# Patient Record
Sex: FEMALE | Race: WHITE | NOT HISPANIC OR LATINO | Employment: PART TIME | URBAN - METROPOLITAN AREA
[De-identification: names, ages, dates, MRNs, and addresses within clinical notes are randomized per-mention and may not be internally consistent; named-entity substitution may affect disease eponyms.]

---

## 2022-04-06 ENCOUNTER — HISTORICAL (OUTPATIENT)
Dept: ADMINISTRATIVE | Facility: HOSPITAL | Age: 47
End: 2022-04-06

## 2022-04-06 LAB
ABS NEUT (OLG): 7.46 (ref 2.1–9.2)
ALBUMIN SERPL-MCNC: 3.7 G/DL (ref 3.5–5)
ALBUMIN/GLOB SERPL: 1.1 {RATIO} (ref 1.1–2)
ALP SERPL-CCNC: 87 U/L (ref 40–150)
ALT SERPL-CCNC: 92 U/L (ref 0–55)
AST SERPL-CCNC: 64 U/L (ref 5–34)
BASOPHILS # BLD AUTO: 0 10*3/UL (ref 0–0.2)
BASOPHILS NFR BLD AUTO: 0 %
BILIRUB SERPL-MCNC: 0.4 MG/DL
BILIRUBIN DIRECT+TOT PNL SERPL-MCNC: 0.2 (ref 0–0.5)
BILIRUBIN DIRECT+TOT PNL SERPL-MCNC: 0.2 (ref 0–0.8)
BUN SERPL-MCNC: 4.8 MG/DL (ref 7–18.7)
CALCIUM SERPL-MCNC: 9.1 MG/DL (ref 8.7–10.5)
CHLORIDE SERPL-SCNC: 99 MMOL/L (ref 98–107)
CO2 SERPL-SCNC: 27 MMOL/L (ref 22–29)
CREAT SERPL-MCNC: 0.69 MG/DL (ref 0.55–1.02)
CRP SERPL HS-MCNC: 1.82 MG/L
DEPRECATED CALCIDIOL+CALCIFEROL SERPL-MC: 17.5 NG/ML (ref 30–80)
EOSINOPHIL # BLD AUTO: 0.2 10*3/UL (ref 0–0.9)
EOSINOPHIL NFR BLD AUTO: 2 %
ERYTHROCYTE [DISTWIDTH] IN BLOOD BY AUTOMATED COUNT: 13.1 % (ref 11.5–14.5)
FLAG2 (OHS): 70
FLAG3 (OHS): 80
FLAGS (OHS): 80
GLOBULIN SER-MCNC: 3.4 G/DL (ref 2.4–3.5)
GLUCOSE SERPL-MCNC: 159 MG/DL (ref 74–100)
HCT VFR BLD AUTO: 41.2 % (ref 35–46)
HEMOLYSIS INTERF INDEX SERPL-ACNC: 4
HGB BLD-MCNC: 13.5 G/DL (ref 12–16)
ICTERIC INTERF INDEX SERPL-ACNC: 1
IMM GRANULOCYTES # BLD AUTO: 0.04 10*3/UL
IMM GRANULOCYTES NFR BLD AUTO: 0 %
LIPEMIC INTERF INDEX SERPL-ACNC: 3
LOW EVENT # SUSPECT FLAG (OHS): 80
LYMPHOCYTES # BLD AUTO: 1.7 10*3/UL (ref 0.6–4.6)
LYMPHOCYTES NFR BLD AUTO: 16 %
MANUAL DIFF? (OHS): NO
MCH RBC QN AUTO: 29.7 PG (ref 26–34)
MCHC RBC AUTO-ENTMCNC: 32.8 G/DL (ref 31–37)
MCV RBC AUTO: 90.7 FL (ref 80–100)
MO BLASTS SUSPECT FLAG (OHS): 40
MONOCYTES # BLD AUTO: 0.8 10*3/UL (ref 0.1–1.3)
MONOCYTES NFR BLD AUTO: 8 %
NEUTROPHILS # BLD AUTO: 7.46 10*3/UL (ref 2.1–9.2)
NEUTROPHILS NFR BLD AUTO: 73 %
NRBC BLD AUTO-RTO: 0 % (ref 0–0.2)
PLATELET # BLD AUTO: 264 10*3/UL (ref 130–400)
PLATELET CLUMPS SUSPECT FLAG (OHS): 70
PMV BLD AUTO: 12.9 FL (ref 7.4–10.4)
POTASSIUM SERPL-SCNC: 4.6 MMOL/L (ref 3.5–5.1)
PROT SERPL-MCNC: 7.1 G/DL (ref 6.4–8.3)
RBC # BLD AUTO: 4.54 10*6/UL (ref 4–5.2)
SODIUM SERPL-SCNC: 137 MMOL/L (ref 136–145)
URATE SERPL-MCNC: 6.3 MG/DL (ref 2.6–6)
WBC # SPEC AUTO: 10.2 10*3/UL (ref 4.5–11)

## 2022-07-05 ENCOUNTER — OFFICE VISIT (OUTPATIENT)
Dept: RHEUMATOLOGY | Facility: CLINIC | Age: 47
End: 2022-07-05
Payer: MEDICAID

## 2022-07-05 VITALS
TEMPERATURE: 99 F | DIASTOLIC BLOOD PRESSURE: 88 MMHG | SYSTOLIC BLOOD PRESSURE: 138 MMHG | HEART RATE: 94 BPM | BODY MASS INDEX: 41.78 KG/M2 | HEIGHT: 66 IN | RESPIRATION RATE: 18 BRPM | WEIGHT: 260 LBS | OXYGEN SATURATION: 99 %

## 2022-07-05 DIAGNOSIS — L40.50 PSORIATIC ARTHRITIS: Primary | ICD-10-CM

## 2022-07-05 DIAGNOSIS — E79.0 HYPERURICEMIA: ICD-10-CM

## 2022-07-05 DIAGNOSIS — E55.9 VITAMIN D DEFICIENCY: ICD-10-CM

## 2022-07-05 DIAGNOSIS — E03.8 CENTRAL HYPOTHYROIDISM: ICD-10-CM

## 2022-07-05 DIAGNOSIS — L40.9 PSORIASIS: ICD-10-CM

## 2022-07-05 DIAGNOSIS — M79.7 FIBROMYALGIA SYNDROME: ICD-10-CM

## 2022-07-05 PROCEDURE — 4010F ACE/ARB THERAPY RXD/TAKEN: CPT | Mod: CPTII,,, | Performed by: INTERNAL MEDICINE

## 2022-07-05 PROCEDURE — 3008F PR BODY MASS INDEX (BMI) DOCUMENTED: ICD-10-PCS | Mod: CPTII,,, | Performed by: INTERNAL MEDICINE

## 2022-07-05 PROCEDURE — 3008F BODY MASS INDEX DOCD: CPT | Mod: CPTII,,, | Performed by: INTERNAL MEDICINE

## 2022-07-05 PROCEDURE — 99999 PR PBB SHADOW E&M-EST. PATIENT-LVL IV: ICD-10-PCS | Mod: PBBFAC,,, | Performed by: INTERNAL MEDICINE

## 2022-07-05 PROCEDURE — 99214 OFFICE O/P EST MOD 30 MIN: CPT | Mod: S$PBB,,, | Performed by: INTERNAL MEDICINE

## 2022-07-05 PROCEDURE — 1160F RVW MEDS BY RX/DR IN RCRD: CPT | Mod: CPTII,,, | Performed by: INTERNAL MEDICINE

## 2022-07-05 PROCEDURE — 1159F PR MEDICATION LIST DOCUMENTED IN MEDICAL RECORD: ICD-10-PCS | Mod: CPTII,,, | Performed by: INTERNAL MEDICINE

## 2022-07-05 PROCEDURE — 1160F PR REVIEW ALL MEDS BY PRESCRIBER/CLIN PHARMACIST DOCUMENTED: ICD-10-PCS | Mod: CPTII,,, | Performed by: INTERNAL MEDICINE

## 2022-07-05 PROCEDURE — 99214 PR OFFICE/OUTPT VISIT, EST, LEVL IV, 30-39 MIN: ICD-10-PCS | Mod: S$PBB,,, | Performed by: INTERNAL MEDICINE

## 2022-07-05 PROCEDURE — 99214 OFFICE O/P EST MOD 30 MIN: CPT | Mod: PBBFAC | Performed by: INTERNAL MEDICINE

## 2022-07-05 PROCEDURE — 4010F PR ACE/ARB THEARPY RXD/TAKEN: ICD-10-PCS | Mod: CPTII,,, | Performed by: INTERNAL MEDICINE

## 2022-07-05 PROCEDURE — 99999 PR PBB SHADOW E&M-EST. PATIENT-LVL IV: CPT | Mod: PBBFAC,,, | Performed by: INTERNAL MEDICINE

## 2022-07-05 PROCEDURE — 1159F MED LIST DOCD IN RCRD: CPT | Mod: CPTII,,, | Performed by: INTERNAL MEDICINE

## 2022-07-05 RX ORDER — CLOBETASOL PROPIONATE 0.5 MG/G
1 CREAM TOPICAL 2 TIMES DAILY
COMMUNITY
Start: 2022-05-05 | End: 2023-03-03 | Stop reason: SDUPTHER

## 2022-07-05 RX ORDER — PRAVASTATIN SODIUM 10 MG/1
10 TABLET ORAL NIGHTLY
COMMUNITY
Start: 2022-05-08

## 2022-07-05 RX ORDER — CALCIPOTRIENE AND BETAMETHASONE DIPROPIONATE 50; .5 UG/G; MG/G
SUSPENSION TOPICAL
COMMUNITY
Start: 2022-06-04 | End: 2022-09-09 | Stop reason: SDUPTHER

## 2022-07-05 RX ORDER — FOLIC ACID 1 MG/1
1000 TABLET ORAL DAILY
COMMUNITY
Start: 2022-06-28 | End: 2022-07-05

## 2022-07-05 RX ORDER — CHOLECALCIFEROL (VITAMIN D3) 1250 MCG
50000 CAPSULE ORAL
COMMUNITY
Start: 2022-06-28 | End: 2023-03-03

## 2022-07-05 RX ORDER — TRIAMCINOLONE ACETONIDE 1 MG/G
CREAM TOPICAL 2 TIMES DAILY
COMMUNITY
Start: 2022-06-04 | End: 2023-03-03 | Stop reason: SDUPTHER

## 2022-07-05 RX ORDER — INSULIN GLARGINE 100 [IU]/ML
20 INJECTION, SOLUTION SUBCUTANEOUS
COMMUNITY

## 2022-07-05 RX ORDER — LIRAGLUTIDE 6 MG/ML
1.8 INJECTION SUBCUTANEOUS
COMMUNITY

## 2022-07-05 RX ORDER — LISINOPRIL AND HYDROCHLOROTHIAZIDE 12.5; 2 MG/1; MG/1
1 TABLET ORAL DAILY
COMMUNITY
Start: 2022-06-07 | End: 2022-09-09 | Stop reason: SDUPTHER

## 2022-07-05 RX ORDER — CYCLOBENZAPRINE HCL 10 MG
10 TABLET ORAL NIGHTLY
COMMUNITY
Start: 2022-05-23 | End: 2022-07-05

## 2022-07-05 RX ORDER — TIZANIDINE 4 MG/1
4 TABLET ORAL NIGHTLY
Qty: 30 TABLET | Refills: 5 | Status: SHIPPED | OUTPATIENT
Start: 2022-07-05 | End: 2022-10-18

## 2022-07-05 RX ORDER — APREMILAST 30 MG/1
1 TABLET, FILM COATED ORAL 2 TIMES DAILY
COMMUNITY
Start: 2022-07-02 | End: 2022-07-05 | Stop reason: SDUPTHER

## 2022-07-05 RX ORDER — APREMILAST 30 MG/1
1 TABLET, FILM COATED ORAL 2 TIMES DAILY
Qty: 60 TABLET | Refills: 11 | Status: SHIPPED | OUTPATIENT
Start: 2022-07-05 | End: 2023-03-03 | Stop reason: SINTOL

## 2022-07-05 RX ORDER — DESVENLAFAXINE 100 MG/1
100 TABLET, EXTENDED RELEASE ORAL DAILY
COMMUNITY
Start: 2022-07-03 | End: 2023-10-10

## 2022-07-05 RX ORDER — LEFLUNOMIDE 20 MG/1
20 TABLET ORAL DAILY
Qty: 30 TABLET | Refills: 11 | Status: SHIPPED | OUTPATIENT
Start: 2022-07-05 | End: 2023-03-03

## 2022-07-05 RX ORDER — AMLODIPINE BESYLATE 5 MG/1
5 TABLET ORAL DAILY
COMMUNITY
Start: 2022-06-04

## 2022-07-05 RX ORDER — METHOTREXATE 2.5 MG/1
10 TABLET ORAL
COMMUNITY
Start: 2022-06-20 | End: 2022-07-05

## 2022-07-05 NOTE — PROGRESS NOTES
"Subjective:       Patient ID: Bing Pedraza is a 46 y.o. female.    Chief Complaint: 3 month follow up (Rash on face & medication questions)    The patient is complaining of joint pain involving the MCP PIP wrist elbow shoulders hips knees and ankles bilaterally.  The pain is 5/10 in intensity dull in quality and continuous.  That is associated with a morning stiffness lasting for more than 60 minutes.  Is also having difficulty maintaining a good night of sleep.  This has been associated with myalgias.  Muscle aches are 6/10 in intensity dull in quality and continuous.  They are associated with fatigue.  Psoriasis      Review of Systems   Constitutional: Negative for appetite change, chills and fever.   HENT: Negative for congestion, ear pain, mouth sores, nosebleeds and trouble swallowing.    Eyes: Negative for photophobia and discharge.   Respiratory: Negative for chest tightness and shortness of breath.    Cardiovascular: Negative for chest pain.   Gastrointestinal: Negative for abdominal pain and vomiting.   Endocrine: Negative.    Genitourinary: Negative for hematuria.   Musculoskeletal:        As per HPI   Skin: Positive for rash.        Psoriasis   Neurological: Negative for weakness.         Objective:   /88 (BP Location: Right arm, Patient Position: Sitting, BP Method: Large (Automatic))   Pulse 94   Temp 98.8 °F (37.1 °C)   Resp 18   Ht 5' 6" (1.676 m)   Wt 117.9 kg (260 lb)   SpO2 99%   BMI 41.97 kg/m²      Physical Exam   Constitutional: She is oriented to person, place, and time. She appears well-developed and well-nourished. No distress.   HENT:   Head: Normocephalic and atraumatic.   Right Ear: External ear normal.   Left Ear: External ear normal.   Eyes: Pupils are equal, round, and reactive to light.   Cardiovascular: Normal rate, regular rhythm and normal heart sounds.   Pulmonary/Chest: Breath sounds normal.   Abdominal: Soft. There is no abdominal tenderness. "   Musculoskeletal:      Right shoulder: Tenderness present.      Left shoulder: Tenderness present.      Right elbow: Tenderness present.      Left elbow: Tenderness present.      Right wrist: Tenderness present.      Left wrist: Tenderness present.      Cervical back: Neck supple.      Right hip: Tenderness present.      Left hip: Tenderness present.      Right knee: Tenderness present.      Left knee: Tenderness present.      Right ankle: Tenderness present.      Left ankle: Tenderness present.   Lymphadenopathy:     She has no cervical adenopathy.   Neurological: She is alert and oriented to person, place, and time. She displays normal reflexes. No cranial nerve deficit or sensory deficit. She exhibits normal muscle tone. Coordination normal.   Skin: There is erythema.   Psoriasis   Vitals reviewed.      Right Side Rheumatological Exam     The patient is tender to palpation of the shoulder, elbow, wrist, knee, 1st PIP, 1st MCP, 2nd PIP, 2nd MCP, 3rd PIP, 3rd MCP, 4th PIP, 4th MCP, 5th PIP, hip, ankle, 1st MTP, 2nd MTP, 3rd MTP, 4th MTP, 5th MTP, 1st toe IP, 2nd toe IP, 3rd toe IP, 4th toe IP and 5th toe IP    Left Side Rheumatological Exam     The patient is tender to palpation of the shoulder, elbow, wrist, knee, 1st PIP, 1st MCP, 2nd PIP, 2nd MCP, 3rd PIP, 3rd MCP, 4th PIP, 4th MCP, 5th PIP, 5th MCP, hip, ankle, 1st MTP, 2nd MTP, 3rd MTP, 4th MTP, 5th MTP, 1st toe IP, 2nd toe IP, 3rd toe IP, 4th toe IP and 5th toe IP.         Completed Fibromyalgia exam 18/18 tender points.  No data to display     Assessment:       1. Psoriatic arthritis    2. Psoriasis    3. Fibromyalgia syndrome    4. Central hypothyroidism    5. Hyperuricemia    6. Vitamin D deficiency            Plan:       Problem List Items Addressed This Visit    None     Visit Diagnoses     Psoriatic arthritis    -  Primary    Relevant Medications    leflunomide (ARAVA) 20 MG Tab    OTEZLA 30 mg Tab    tiZANidine (ZANAFLEX) 4 MG tablet    Other Relevant  Orders    CBC Auto Differential    Comprehensive Metabolic Panel    CRP, High Sensitivity    Urinalysis    Vitamin D    Uric Acid    Urinalysis    Psoriasis        Relevant Medications    leflunomide (ARAVA) 20 MG Tab    OTEZLA 30 mg Tab    tiZANidine (ZANAFLEX) 4 MG tablet    Other Relevant Orders    CBC Auto Differential    Comprehensive Metabolic Panel    CRP, High Sensitivity    Urinalysis    Vitamin D    Uric Acid    Urinalysis    Fibromyalgia syndrome        Relevant Medications    leflunomide (ARAVA) 20 MG Tab    OTEZLA 30 mg Tab    tiZANidine (ZANAFLEX) 4 MG tablet    Other Relevant Orders    CBC Auto Differential    Comprehensive Metabolic Panel    CRP, High Sensitivity    Urinalysis    Vitamin D    Uric Acid    Urinalysis    Central hypothyroidism        Relevant Medications    leflunomide (ARAVA) 20 MG Tab    OTEZLA 30 mg Tab    tiZANidine (ZANAFLEX) 4 MG tablet    Other Relevant Orders    CBC Auto Differential    Comprehensive Metabolic Panel    CRP, High Sensitivity    Urinalysis    Vitamin D    Uric Acid    Urinalysis    Hyperuricemia        Relevant Medications    leflunomide (ARAVA) 20 MG Tab    OTEZLA 30 mg Tab    tiZANidine (ZANAFLEX) 4 MG tablet    Other Relevant Orders    CBC Auto Differential    Comprehensive Metabolic Panel    CRP, High Sensitivity    Urinalysis    Vitamin D    Uric Acid    Urinalysis    Vitamin D deficiency        Relevant Medications    leflunomide (ARAVA) 20 MG Tab    OTEZLA 30 mg Tab    tiZANidine (ZANAFLEX) 4 MG tablet    Other Relevant Orders    CBC Auto Differential    Comprehensive Metabolic Panel    CRP, High Sensitivity    Urinalysis    Vitamin D    Uric Acid    Urinalysis

## 2022-09-09 ENCOUNTER — TELEPHONE (OUTPATIENT)
Dept: RHEUMATOLOGY | Facility: CLINIC | Age: 47
End: 2022-09-09
Payer: MEDICAID

## 2022-09-09 DIAGNOSIS — I10 HYPERTENSION, UNSPECIFIED TYPE: ICD-10-CM

## 2022-09-09 DIAGNOSIS — L40.50 PSORIATIC ARTHRITIS: Primary | ICD-10-CM

## 2022-09-09 RX ORDER — CALCIPOTRIENE AND BETAMETHASONE DIPROPIONATE 50; .5 UG/G; MG/G
SUSPENSION TOPICAL
Qty: 60 G | Refills: 5 | Status: SHIPPED | OUTPATIENT
Start: 2022-09-09 | End: 2023-02-14 | Stop reason: SDUPTHER

## 2022-09-09 RX ORDER — LISINOPRIL AND HYDROCHLOROTHIAZIDE 12.5; 2 MG/1; MG/1
1 TABLET ORAL DAILY
Qty: 30 TABLET | Refills: 5 | Status: SHIPPED | OUTPATIENT
Start: 2022-09-09 | End: 2023-06-12

## 2022-09-09 NOTE — TELEPHONE ENCOUNTER
----- Message from Radha Lemus sent at 9/9/2022 10:14 AM CDT -----  Regarding: medication refill  Patient called requesting refill for Lisinopril and calcipotriene-betamethasone. [facility]

## 2022-09-14 DIAGNOSIS — Z86.711 HX OF PULMONARY EMBOLUS: Primary | ICD-10-CM

## 2022-10-11 ENCOUNTER — LAB VISIT (OUTPATIENT)
Dept: LAB | Facility: HOSPITAL | Age: 47
End: 2022-10-11
Attending: INTERNAL MEDICINE
Payer: MEDICAID

## 2022-10-11 DIAGNOSIS — E79.0 HYPERURICEMIA: ICD-10-CM

## 2022-10-11 DIAGNOSIS — E55.9 VITAMIN D DEFICIENCY: ICD-10-CM

## 2022-10-11 DIAGNOSIS — L40.50 PSORIATIC ARTHRITIS: ICD-10-CM

## 2022-10-11 DIAGNOSIS — M79.7 FIBROMYALGIA SYNDROME: ICD-10-CM

## 2022-10-11 DIAGNOSIS — E03.8 CENTRAL HYPOTHYROIDISM: ICD-10-CM

## 2022-10-11 DIAGNOSIS — L40.9 PSORIASIS: ICD-10-CM

## 2022-10-11 DIAGNOSIS — E11.9 DIABETES MELLITUS WITHOUT COMPLICATION: Primary | ICD-10-CM

## 2022-10-11 DIAGNOSIS — Z86.711 HX OF PULMONARY EMBOLUS: ICD-10-CM

## 2022-10-11 LAB
ALBUMIN SERPL-MCNC: 4.1 GM/DL (ref 3.5–5)
ALBUMIN/GLOB SERPL: 1 RATIO (ref 1.1–2)
ALP SERPL-CCNC: 94 UNIT/L (ref 40–150)
ALT SERPL-CCNC: 143 UNIT/L (ref 0–55)
APPEARANCE UR: CLEAR
AST SERPL-CCNC: 107 UNIT/L (ref 5–34)
BACTERIA #/AREA URNS AUTO: NORMAL /HPF
BASOPHILS # BLD AUTO: 0.08 X10(3)/MCL (ref 0–0.2)
BASOPHILS NFR BLD AUTO: 0.7 %
BILIRUB UR QL STRIP.AUTO: NEGATIVE MG/DL
BILIRUBIN DIRECT+TOT PNL SERPL-MCNC: 0.5 MG/DL
BUN SERPL-MCNC: 11.6 MG/DL (ref 7–18.7)
CALCIUM SERPL-MCNC: 10.5 MG/DL (ref 8.4–10.2)
CHLORIDE SERPL-SCNC: 96 MMOL/L (ref 98–107)
CO2 SERPL-SCNC: 28 MMOL/L (ref 22–29)
COLOR UR AUTO: YELLOW
CREAT SERPL-MCNC: 1.02 MG/DL (ref 0.55–1.02)
CRP SERPL HS-MCNC: 23.57 MG/L
D DIMER PPP IA.FEU-MCNC: 2.22 UG/ML FEU (ref 0–0.5)
DEPRECATED CALCIDIOL+CALCIFEROL SERPL-MC: 145.5 NG/ML (ref 30–80)
EOSINOPHIL # BLD AUTO: 0.18 X10(3)/MCL (ref 0–0.9)
EOSINOPHIL NFR BLD AUTO: 1.6 %
ERYTHROCYTE [DISTWIDTH] IN BLOOD BY AUTOMATED COUNT: 13.2 % (ref 11.5–17)
EST. AVERAGE GLUCOSE BLD GHB EST-MCNC: 154.2 MG/DL
GFR SERPLBLD CREATININE-BSD FMLA CKD-EPI: >60 MLS/MIN/1.73/M2
GLOBULIN SER-MCNC: 4 GM/DL (ref 2.4–3.5)
GLUCOSE SERPL-MCNC: 205 MG/DL (ref 74–100)
GLUCOSE UR QL STRIP.AUTO: NEGATIVE MG/DL
HBA1C MFR BLD: 7 %
HCT VFR BLD AUTO: 46.2 % (ref 37–47)
HGB BLD-MCNC: 15.2 GM/DL (ref 12–16)
IMM GRANULOCYTES # BLD AUTO: 0.09 X10(3)/MCL (ref 0–0.04)
IMM GRANULOCYTES NFR BLD AUTO: 0.8 %
KETONES UR QL STRIP.AUTO: NEGATIVE MG/DL
LEUKOCYTE ESTERASE UR QL STRIP.AUTO: NEGATIVE UNIT/L
LYMPHOCYTES # BLD AUTO: 2.25 X10(3)/MCL (ref 0.6–4.6)
LYMPHOCYTES NFR BLD AUTO: 20.3 %
MCH RBC QN AUTO: 29.9 PG (ref 27–31)
MCHC RBC AUTO-ENTMCNC: 32.9 MG/DL (ref 33–36)
MCV RBC AUTO: 90.9 FL (ref 80–94)
MONOCYTES # BLD AUTO: 0.79 X10(3)/MCL (ref 0.1–1.3)
MONOCYTES NFR BLD AUTO: 7.1 %
NEUTROPHILS # BLD AUTO: 7.7 X10(3)/MCL (ref 2.1–9.2)
NEUTROPHILS NFR BLD AUTO: 69.5 %
NITRITE UR QL STRIP.AUTO: NEGATIVE
NRBC BLD AUTO-RTO: 0 %
PH UR STRIP.AUTO: 6 [PH]
PLATELET # BLD AUTO: 346 X10(3)/MCL (ref 130–400)
PMV BLD AUTO: 12.2 FL (ref 7.4–10.4)
POTASSIUM SERPL-SCNC: 5.1 MMOL/L (ref 3.5–5.1)
PROT SERPL-MCNC: 8.1 GM/DL (ref 6.4–8.3)
PROT UR QL STRIP.AUTO: NEGATIVE MG/DL
RBC # BLD AUTO: 5.08 X10(6)/MCL (ref 4.2–5.4)
RBC #/AREA URNS AUTO: <5 /HPF
RBC UR QL AUTO: NEGATIVE UNIT/L
SODIUM SERPL-SCNC: 135 MMOL/L (ref 136–145)
SP GR UR STRIP.AUTO: 1.01 (ref 1–1.03)
SQUAMOUS #/AREA URNS AUTO: <5 /HPF
URATE SERPL-MCNC: 6.7 MG/DL (ref 2.6–6)
UROBILINOGEN UR STRIP-ACNC: 0.2 MG/DL
WBC # SPEC AUTO: 11.1 X10(3)/MCL (ref 4.5–11.5)
WBC #/AREA URNS AUTO: <5 /HPF

## 2022-10-11 PROCEDURE — 82306 VITAMIN D 25 HYDROXY: CPT

## 2022-10-11 PROCEDURE — 36415 COLL VENOUS BLD VENIPUNCTURE: CPT

## 2022-10-11 PROCEDURE — 80053 COMPREHEN METABOLIC PANEL: CPT

## 2022-10-11 PROCEDURE — 86141 C-REACTIVE PROTEIN HS: CPT

## 2022-10-11 PROCEDURE — 85025 COMPLETE CBC W/AUTO DIFF WBC: CPT

## 2022-10-11 PROCEDURE — 83036 HEMOGLOBIN GLYCOSYLATED A1C: CPT

## 2022-10-11 PROCEDURE — 84550 ASSAY OF BLOOD/URIC ACID: CPT

## 2022-10-11 PROCEDURE — 85379 FIBRIN DEGRADATION QUANT: CPT

## 2022-10-11 PROCEDURE — 81001 URINALYSIS AUTO W/SCOPE: CPT

## 2022-10-17 ENCOUNTER — OFFICE VISIT (OUTPATIENT)
Dept: RHEUMATOLOGY | Facility: CLINIC | Age: 47
End: 2022-10-17
Payer: MEDICAID

## 2022-10-17 VITALS
HEART RATE: 100 BPM | TEMPERATURE: 98 F | DIASTOLIC BLOOD PRESSURE: 86 MMHG | OXYGEN SATURATION: 98 % | SYSTOLIC BLOOD PRESSURE: 124 MMHG | HEIGHT: 66 IN | BODY MASS INDEX: 38.38 KG/M2 | WEIGHT: 238.81 LBS | RESPIRATION RATE: 18 BRPM

## 2022-10-17 DIAGNOSIS — L40.50 PSORIATIC ARTHRITIS: Primary | ICD-10-CM

## 2022-10-17 DIAGNOSIS — E03.8 CENTRAL HYPOTHYROIDISM: ICD-10-CM

## 2022-10-17 DIAGNOSIS — E55.9 VITAMIN D DEFICIENCY: ICD-10-CM

## 2022-10-17 DIAGNOSIS — M79.7 FIBROMYALGIA SYNDROME: ICD-10-CM

## 2022-10-17 DIAGNOSIS — I10 HYPERTENSION, UNSPECIFIED TYPE: ICD-10-CM

## 2022-10-17 DIAGNOSIS — L40.9 PSORIASIS: ICD-10-CM

## 2022-10-17 DIAGNOSIS — E79.0 HYPERURICEMIA: ICD-10-CM

## 2022-10-17 DIAGNOSIS — E78.5 DYSLIPIDEMIA: ICD-10-CM

## 2022-10-17 PROCEDURE — 1159F PR MEDICATION LIST DOCUMENTED IN MEDICAL RECORD: ICD-10-PCS | Mod: CPTII,,, | Performed by: INTERNAL MEDICINE

## 2022-10-17 PROCEDURE — 99214 OFFICE O/P EST MOD 30 MIN: CPT | Mod: S$PBB,,, | Performed by: INTERNAL MEDICINE

## 2022-10-17 PROCEDURE — 4010F PR ACE/ARB THEARPY RXD/TAKEN: ICD-10-PCS | Mod: CPTII,,, | Performed by: INTERNAL MEDICINE

## 2022-10-17 PROCEDURE — 3074F SYST BP LT 130 MM HG: CPT | Mod: CPTII,,, | Performed by: INTERNAL MEDICINE

## 2022-10-17 PROCEDURE — 1159F MED LIST DOCD IN RCRD: CPT | Mod: CPTII,,, | Performed by: INTERNAL MEDICINE

## 2022-10-17 PROCEDURE — 3079F PR MOST RECENT DIASTOLIC BLOOD PRESSURE 80-89 MM HG: ICD-10-PCS | Mod: CPTII,,, | Performed by: INTERNAL MEDICINE

## 2022-10-17 PROCEDURE — 3079F DIAST BP 80-89 MM HG: CPT | Mod: CPTII,,, | Performed by: INTERNAL MEDICINE

## 2022-10-17 PROCEDURE — 99214 OFFICE O/P EST MOD 30 MIN: CPT | Mod: PBBFAC | Performed by: INTERNAL MEDICINE

## 2022-10-17 PROCEDURE — 99999 PR PBB SHADOW E&M-EST. PATIENT-LVL IV: CPT | Mod: PBBFAC,,, | Performed by: INTERNAL MEDICINE

## 2022-10-17 PROCEDURE — 3074F PR MOST RECENT SYSTOLIC BLOOD PRESSURE < 130 MM HG: ICD-10-PCS | Mod: CPTII,,, | Performed by: INTERNAL MEDICINE

## 2022-10-17 PROCEDURE — 4010F ACE/ARB THERAPY RXD/TAKEN: CPT | Mod: CPTII,,, | Performed by: INTERNAL MEDICINE

## 2022-10-17 PROCEDURE — 99214 PR OFFICE/OUTPT VISIT, EST, LEVL IV, 30-39 MIN: ICD-10-PCS | Mod: S$PBB,,, | Performed by: INTERNAL MEDICINE

## 2022-10-17 PROCEDURE — 99999 PR PBB SHADOW E&M-EST. PATIENT-LVL IV: ICD-10-PCS | Mod: PBBFAC,,, | Performed by: INTERNAL MEDICINE

## 2022-10-17 NOTE — PROGRESS NOTES
"Subjective:       Patient ID: Bing Pedraza is a 47 y.o. female.    Chief Complaint: 3 Month follow up (^ R leg pain radiating down R foot)    The patient is complaining of joint pain involving the MCP PIP wrist elbow shoulders hips knees and ankles bilaterally.  The pain is 10/10 in intensity dull in quality and continuous.  That is associated with a morning stiffness lasting for more than 60 minutes.  Is also having difficulty maintaining a good night of sleep.  This has been associated with myalgias.  Muscle aches are 10/10 in intensity dull in quality and continuous.  They are associated with fatigue.  No fever no chills no others.  PSORIASIS FLARE UP. 90 % BSA    Review of Systems   Constitutional:  Negative for appetite change, chills and fever.   HENT:  Negative for congestion, ear pain, mouth sores, nosebleeds and trouble swallowing.    Eyes:  Negative for photophobia and discharge.   Respiratory:  Negative for chest tightness and shortness of breath.    Cardiovascular:  Negative for chest pain.   Gastrointestinal:  Negative for abdominal pain and vomiting.   Endocrine: Negative.    Genitourinary:  Negative for hematuria.   Musculoskeletal:         As per HPI   Skin:  Positive for rash.        PSORIASIS FLARE UP. 90 % BSA   Neurological:  Negative for weakness.       Objective:   /86 (BP Location: Left arm, Patient Position: Sitting, BP Method: Large (Automatic))   Pulse 100   Temp 98.3 °F (36.8 °C) (Temporal)   Resp 18   Ht 5' 6" (1.676 m)   Wt 108.3 kg (238 lb 12.8 oz)   SpO2 98%   BMI 38.54 kg/m²      Physical Exam   Musculoskeletal:      Right shoulder: Tenderness present.      Left shoulder: Tenderness present.      Right elbow: Tenderness present.      Left elbow: Tenderness present.      Right wrist: Tenderness present.      Left wrist: Tenderness present.      Right hip: Tenderness present.      Left hip: Tenderness present.      Right knee: Tenderness present.      Left knee: " Tenderness present.      Right ankle: Tenderness present.      Left ankle: Tenderness present.   Skin:   PSORIASIS FLARE UP. 90 % BSA       Right Side Rheumatological Exam     The patient is tender to palpation of the shoulder, elbow, wrist, knee, 1st PIP, 1st MCP, 2nd PIP, 2nd MCP, 3rd PIP, 3rd MCP, 4th PIP, 4th MCP, 5th PIP, hip, ankle, 1st MTP, 2nd MTP, 3rd MTP, 4th MTP, 5th MTP, 1st toe IP, 2nd toe IP, 3rd toe IP, 4th toe IP and 5th toe IP    Left Side Rheumatological Exam     The patient is tender to palpation of the shoulder, elbow, wrist, knee, 1st PIP, 1st MCP, 2nd PIP, 2nd MCP, 3rd PIP, 3rd MCP, 4th PIP, 4th MCP, 5th PIP, 5th MCP, hip, ankle, 1st MTP, 2nd MTP, 3rd MTP, 4th MTP, 5th MTP, 1st toe IP, 2nd toe IP, 3rd toe IP, 4th toe IP and 5th toe IP.       Completed Fibromyalgia exam 18/18 tender points.  No data to display     Assessment:       1. Psoriatic arthritis    2. Fibromyalgia syndrome    3. Hypertension, unspecified type    4. Psoriasis    5. Central hypothyroidism    6. Hyperuricemia    7. Vitamin D deficiency    8. Dyslipidemia              Plan:       Problem List Items Addressed This Visit          Derm    Psoriasis    Relevant Medications    secukinumab (COSENTYX PEN) 150 mg/mL PnIj    Other Relevant Orders    Quantiferon Gold TB    CBC Auto Differential    Comprehensive Metabolic Panel    C-Reactive Protein    POCT Lipid Profile with Glucose    TSH    T4, Free    T3, Free (OLG)    SCHEDULED EKG 12-LEAD (to Muse)       Endocrine    Vitamin D deficiency    Relevant Medications    secukinumab (COSENTYX PEN) 150 mg/mL PnIj    Other Relevant Orders    Quantiferon Gold TB    CBC Auto Differential    Comprehensive Metabolic Panel    C-Reactive Protein    POCT Lipid Profile with Glucose    TSH    T4, Free    T3, Free (OLG)    SCHEDULED EKG 12-LEAD (to Muse)     Other Visit Diagnoses       Psoriatic arthritis    -  Primary    Relevant Medications    secukinumab (COSENTYX PEN) 150 mg/mL PnIj    Other  Relevant Orders    Quantiferon Gold TB    CBC Auto Differential    Comprehensive Metabolic Panel    C-Reactive Protein    POCT Lipid Profile with Glucose    TSH    T4, Free    T3, Free (OLG)    SCHEDULED EKG 12-LEAD (to Muse)    Fibromyalgia syndrome        Relevant Medications    secukinumab (COSENTYX PEN) 150 mg/mL PnIj    Other Relevant Orders    Quantiferon Gold TB    CBC Auto Differential    Comprehensive Metabolic Panel    C-Reactive Protein    POCT Lipid Profile with Glucose    TSH    T4, Free    T3, Free (OLG)    SCHEDULED EKG 12-LEAD (to Muse)    Hypertension, unspecified type        Relevant Medications    secukinumab (COSENTYX PEN) 150 mg/mL PnIj    Other Relevant Orders    Quantiferon Gold TB    CBC Auto Differential    Comprehensive Metabolic Panel    C-Reactive Protein    POCT Lipid Profile with Glucose    TSH    T4, Free    T3, Free (OLG)    SCHEDULED EKG 12-LEAD (to Muse)    Central hypothyroidism        Relevant Medications    secukinumab (COSENTYX PEN) 150 mg/mL PnIj    Other Relevant Orders    Quantiferon Gold TB    CBC Auto Differential    Comprehensive Metabolic Panel    C-Reactive Protein    POCT Lipid Profile with Glucose    TSH    T4, Free    T3, Free (OLG)    SCHEDULED EKG 12-LEAD (to Muse)    Hyperuricemia        Relevant Medications    secukinumab (COSENTYX PEN) 150 mg/mL PnIj    Other Relevant Orders    Quantiferon Gold TB    CBC Auto Differential    Comprehensive Metabolic Panel    C-Reactive Protein    POCT Lipid Profile with Glucose    TSH    T4, Free    T3, Free (OLG)    SCHEDULED EKG 12-LEAD (to Muse)    Dyslipidemia        Relevant Medications    secukinumab (COSENTYX PEN) 150 mg/mL PnIj    Other Relevant Orders    Quantiferon Gold TB    CBC Auto Differential    Comprehensive Metabolic Panel    C-Reactive Protein    POCT Lipid Profile with Glucose    TSH    T4, Free    T3, Free (OLG)    SCHEDULED EKG 12-LEAD (to Muse)

## 2022-10-18 ENCOUNTER — TELEPHONE (OUTPATIENT)
Dept: RHEUMATOLOGY | Facility: CLINIC | Age: 47
End: 2022-10-18
Payer: MEDICAID

## 2022-10-18 DIAGNOSIS — E79.0 HYPERURICEMIA: ICD-10-CM

## 2022-10-18 DIAGNOSIS — M79.7 FIBROMYALGIA SYNDROME: ICD-10-CM

## 2022-10-18 DIAGNOSIS — L40.50 PSORIATIC ARTHRITIS: ICD-10-CM

## 2022-10-18 DIAGNOSIS — L40.9 PSORIASIS: ICD-10-CM

## 2022-10-18 DIAGNOSIS — E78.5 DYSLIPIDEMIA: ICD-10-CM

## 2022-10-18 DIAGNOSIS — I10 HYPERTENSION, UNSPECIFIED TYPE: ICD-10-CM

## 2022-10-18 DIAGNOSIS — E03.8 CENTRAL HYPOTHYROIDISM: ICD-10-CM

## 2022-10-18 DIAGNOSIS — E55.9 VITAMIN D DEFICIENCY: ICD-10-CM

## 2022-10-18 RX ORDER — HYDROCODONE BITARTRATE AND ACETAMINOPHEN 10; 325 MG/1; MG/1
1 TABLET ORAL NIGHTLY
Qty: 30 TABLET | Refills: 0 | Status: SHIPPED | OUTPATIENT
Start: 2022-11-17 | End: 2022-12-17

## 2022-10-18 RX ORDER — HYDROCODONE BITARTRATE AND ACETAMINOPHEN 10; 325 MG/1; MG/1
1 TABLET ORAL NIGHTLY
Qty: 30 TABLET | Refills: 0 | Status: SHIPPED | OUTPATIENT
Start: 2022-10-18 | End: 2022-11-01 | Stop reason: SDUPTHER

## 2022-10-18 NOTE — TELEPHONE ENCOUNTER
The patient is willing to give the norco 10 a try . [facility] .pharmacy updated in chart.   Thanks

## 2022-11-01 RX ORDER — HYDROCODONE BITARTRATE AND ACETAMINOPHEN 10; 325 MG/1; MG/1
1 TABLET ORAL NIGHTLY
Qty: 30 TABLET | Refills: 0 | Status: SHIPPED | OUTPATIENT
Start: 2022-11-01 | End: 2022-11-24

## 2022-11-01 NOTE — TELEPHONE ENCOUNTER
----- Message from Emerald Gage sent at 10/31/2022  2:27 PM CDT -----  Regarding: refill  Type:  Needs Medical Advice    Who Called: pt     Pharmacy name and phone #:  HYDROcodone-acetaminophen (NORCO)  mg per tablet 30  Would the patient rather a call back or a response via [facility]? C/b  Best Call Back Number: [address]  Additional Information: pt stated when the script was sent in... the pharmacist was only allowed to give her 7 pills at first.  Now she needs a script for the the remainder of the script (23 tablets)  Please call pt

## 2022-11-30 ENCOUNTER — TELEPHONE (OUTPATIENT)
Dept: RHEUMATOLOGY | Facility: CLINIC | Age: 47
End: 2022-11-30

## 2022-11-30 RX ORDER — OXYCODONE AND ACETAMINOPHEN 10; 325 MG/1; MG/1
1 TABLET ORAL DAILY PRN
Qty: 30 TABLET | Refills: 0 | Status: CANCELLED | OUTPATIENT
Start: 2022-11-30

## 2022-11-30 NOTE — TELEPHONE ENCOUNTER
----- Message from Shama Li sent at 11/30/2022  2:36 PM CST -----  Regarding: Medical Advice  Bing called to get another from of pain medication. [facility] is out of the [place], therefore she would like something else.  # [phone]

## 2022-12-21 NOTE — TELEPHONE ENCOUNTER
Last fill date   Norco pended for Dr. Doran 11/1/22    Of note she did fill Hydrocodone 15 tabs on 12/8/22. Please make sure she does not receive any pain medication from any other provider or Dr. Doran would not be able to fill her pain medication.    Thanks

## 2022-12-21 NOTE — TELEPHONE ENCOUNTER
Patient is requesting Norco 10 for pain. I do not see it on her med list anywhere for me to pend it. She wants it sent to [facility]. Her last visit was 10/2022.   Thank you Joanne

## 2022-12-27 RX ORDER — HYDROCODONE BITARTRATE AND ACETAMINOPHEN 10; 325 MG/1; MG/1
1 TABLET ORAL DAILY PRN
Qty: 30 TABLET | Refills: 0 | Status: SHIPPED | OUTPATIENT
Start: 2022-12-27 | End: 2023-01-26

## 2023-02-13 ENCOUNTER — TELEPHONE (OUTPATIENT)
Dept: RHEUMATOLOGY | Facility: CLINIC | Age: 48
End: 2023-02-13
Payer: MEDICAID

## 2023-02-13 DIAGNOSIS — E55.9 VITAMIN D DEFICIENCY: Primary | ICD-10-CM

## 2023-02-14 DIAGNOSIS — L40.50 PSORIATIC ARTHRITIS: ICD-10-CM

## 2023-02-14 RX ORDER — CALCIPOTRIENE AND BETAMETHASONE DIPROPIONATE 50; .5 UG/G; MG/G
SUSPENSION TOPICAL
Qty: 60 G | Refills: 5 | Status: SHIPPED | OUTPATIENT
Start: 2023-02-14 | End: 2023-02-14 | Stop reason: SDUPTHER

## 2023-02-14 RX ORDER — ASPIRIN 325 MG
50000 TABLET, DELAYED RELEASE (ENTERIC COATED) ORAL
Qty: 15 CAPSULE | Refills: 5 | Status: SHIPPED | OUTPATIENT
Start: 2023-02-14 | End: 2023-03-03

## 2023-02-14 RX ORDER — CALCIPOTRIENE AND BETAMETHASONE DIPROPIONATE 50; .5 UG/G; MG/G
SUSPENSION TOPICAL
Qty: 60 G | Refills: 5 | Status: SHIPPED | OUTPATIENT
Start: 2023-02-14

## 2023-02-14 NOTE — TELEPHONE ENCOUNTER
----- Message from Kristyn Grant MA sent at 2/13/2023  2:25 PM CST -----  Regarding: [id number]  Optimal- d 50,000 unt cap     New rx for ins.  Rx [id number]    [id number] [phone]    Bing parker [birth year] [phone]

## 2023-02-14 NOTE — TELEPHONE ENCOUNTER
See message below.  She is requesting new Rx be sent for that brand.  DLS:  10/17/2022   NOV:  03/03/2023

## 2023-03-03 ENCOUNTER — OFFICE VISIT (OUTPATIENT)
Dept: RHEUMATOLOGY | Facility: CLINIC | Age: 48
End: 2023-03-03
Payer: MEDICAID

## 2023-03-03 VITALS
OXYGEN SATURATION: 98 % | BODY MASS INDEX: 36.63 KG/M2 | SYSTOLIC BLOOD PRESSURE: 140 MMHG | TEMPERATURE: 98 F | HEART RATE: 87 BPM | WEIGHT: 233.38 LBS | HEIGHT: 67 IN | DIASTOLIC BLOOD PRESSURE: 90 MMHG | RESPIRATION RATE: 18 BRPM

## 2023-03-03 DIAGNOSIS — M79.7 FIBROMYALGIA: Primary | ICD-10-CM

## 2023-03-03 DIAGNOSIS — L40.50 PSORIATIC ARTHRITIS: ICD-10-CM

## 2023-03-03 DIAGNOSIS — E55.9 VITAMIN D DEFICIENCY: ICD-10-CM

## 2023-03-03 DIAGNOSIS — L40.9 PSORIASIS: ICD-10-CM

## 2023-03-03 PROCEDURE — 99215 OFFICE O/P EST HI 40 MIN: CPT | Mod: S$PBB,,,

## 2023-03-03 PROCEDURE — 3080F DIAST BP >= 90 MM HG: CPT | Mod: CPTII,,,

## 2023-03-03 PROCEDURE — 99215 PR OFFICE/OUTPT VISIT, EST, LEVL V, 40-54 MIN: ICD-10-PCS | Mod: S$PBB,,,

## 2023-03-03 PROCEDURE — 99999 PR PBB SHADOW E&M-EST. PATIENT-LVL V: CPT | Mod: PBBFAC,,,

## 2023-03-03 PROCEDURE — 3008F PR BODY MASS INDEX (BMI) DOCUMENTED: ICD-10-PCS | Mod: CPTII,,,

## 2023-03-03 PROCEDURE — 99999 PR PBB SHADOW E&M-EST. PATIENT-LVL V: ICD-10-PCS | Mod: PBBFAC,,,

## 2023-03-03 PROCEDURE — 3077F SYST BP >= 140 MM HG: CPT | Mod: CPTII,,,

## 2023-03-03 PROCEDURE — 3008F BODY MASS INDEX DOCD: CPT | Mod: CPTII,,,

## 2023-03-03 PROCEDURE — 1159F MED LIST DOCD IN RCRD: CPT | Mod: CPTII,,,

## 2023-03-03 PROCEDURE — 3080F PR MOST RECENT DIASTOLIC BLOOD PRESSURE >= 90 MM HG: ICD-10-PCS | Mod: CPTII,,,

## 2023-03-03 PROCEDURE — 99215 OFFICE O/P EST HI 40 MIN: CPT | Mod: PBBFAC

## 2023-03-03 PROCEDURE — 3077F PR MOST RECENT SYSTOLIC BLOOD PRESSURE >= 140 MM HG: ICD-10-PCS | Mod: CPTII,,,

## 2023-03-03 PROCEDURE — 1159F PR MEDICATION LIST DOCUMENTED IN MEDICAL RECORD: ICD-10-PCS | Mod: CPTII,,,

## 2023-03-03 RX ORDER — CLOBETASOL PROPIONATE 0.5 MG/G
1 CREAM TOPICAL 2 TIMES DAILY
Qty: 60 G | Refills: 3 | Status: SHIPPED | OUTPATIENT
Start: 2023-03-03 | End: 2023-06-06 | Stop reason: SDUPTHER

## 2023-03-03 RX ORDER — TRIAMCINOLONE ACETONIDE 1 MG/G
CREAM TOPICAL 2 TIMES DAILY
Qty: 453.6 G | Refills: 3 | Status: SHIPPED | OUTPATIENT
Start: 2023-03-03 | End: 2023-06-06 | Stop reason: SDUPTHER

## 2023-03-03 RX ORDER — HYDROCODONE BITARTRATE AND ACETAMINOPHEN 5; 325 MG/1; MG/1
1 TABLET ORAL DAILY PRN
Qty: 30 TABLET | Refills: 0 | Status: CANCELLED | OUTPATIENT
Start: 2023-03-03

## 2023-03-03 RX ORDER — METHYLPREDNISOLONE 4 MG/1
TABLET ORAL
Qty: 21 EACH | Refills: 0 | Status: SHIPPED | OUTPATIENT
Start: 2023-03-03 | End: 2023-06-06

## 2023-03-03 RX ORDER — HYDROCODONE BITARTRATE AND ACETAMINOPHEN 5; 325 MG/1; MG/1
1 TABLET ORAL EVERY 6 HOURS PRN
COMMUNITY
Start: 2022-12-08 | End: 2023-03-03 | Stop reason: SDUPTHER

## 2023-03-03 RX ORDER — DAPSONE 25 MG/1
25 TABLET ORAL 2 TIMES DAILY
Qty: 60 TABLET | Refills: 5 | Status: SHIPPED | OUTPATIENT
Start: 2023-03-03 | End: 2023-06-06 | Stop reason: SDUPTHER

## 2023-03-03 NOTE — PROGRESS NOTES
Subjective:           Patient ID: Bing Pedraza is a 47 y.o. female.    Chief Complaint: Follow-up (Nerve pain in bilateral legs and feet at night /Redness and rash in face and on chest and arms. Itching ./She thought it was due to the methotrexate but she stopped months ago . /Possible wart on Right hand near wrist.)      Initally referred by Dr. Glendy Tsang [facility] care with Dr. Doran on 04/26/2022 The patient is known to have diffuse severe psoriasis and a positive rheumatoid factor.  This combination is very well-known for patients with psoriasis can have psoriatic arthritis associated with positive rheumatoid factor. Reviewed past labs. At last visit she was in a psoriasis flare and she was initiated on cosentyx. Today she has a cutaneous rash that covers anterior chest, face.  She' shad this rash for 3 months. Triamcinolone cream has helped but not enough in tube to see results. She reports joint pain involving the MCP PIP wrist elbow shoulders hips knees and ankles bilaterally.  The pain is 3/10 in intensity dull in quality and continuous.  That is associated with a morning stiffness lasting for less than 60 minutes. This has been associated with myalgias.  Muscle aches are 6/10 in intensity dull in quality and continuous. Today she also reports a new wart on her right hand that has appeared over the last 3 days. Denies fever and chills.         Review of Systems   Constitutional:  Negative for appetite change, chills and fever.   HENT:  Negative for congestion, ear pain, mouth sores, nosebleeds and trouble swallowing.    Eyes:  Negative for photophobia and discharge.   Respiratory:  Negative for chest tightness and shortness of breath.    Cardiovascular:  Negative for chest pain.   Gastrointestinal:  Negative for abdominal pain and vomiting.   Endocrine: Negative.    Genitourinary:  Negative for hematuria.   Musculoskeletal:         As per HPI   Skin:  Negative for rash.   Neurological:   "Negative for weakness.       Objective:   BP (!) 140/90 (BP Location: Right arm, Patient Position: Sitting, BP Method: Medium (Manual))   Pulse 87   Temp 98.2 °F (36.8 °C) (Oral)   Resp 18   Ht 5' 6.5" (1.689 m)   Wt 105.9 kg (233 lb 6.4 oz)   SpO2 98%   BMI 37.11 kg/m²          Physical Exam   Constitutional: She is oriented to person, place, and time. She appears well-developed and well-nourished. No distress.   HENT:   Head: Normocephalic and atraumatic.   Right Ear: External ear normal.   Left Ear: External ear normal.   Eyes: Pupils are equal, round, and reactive to light.   Cardiovascular: Normal rate, regular rhythm and normal heart sounds.   Pulmonary/Chest: Breath sounds normal.   Abdominal: Soft. There is no abdominal tenderness.   Musculoskeletal:      Cervical back: Neck supple.   Lymphadenopathy:     She has no cervical adenopathy.   Neurological: She is alert and oriented to person, place, and time. She displays normal reflexes. No cranial nerve deficit or sensory deficit. She exhibits normal muscle tone. Coordination normal.   Skin: Rash noted. No erythema.   Cutaneous erythematous rash on anterior chest, face, breast area. Pictures in    Vitals reviewed.     Completed Fibromyalgia exam 16/18 tender points.    No data to display     Assessment:         Medication List with Changes/Refills   New Medications    DAPSONE 25 MG TAB    Take 1 tablet (25 mg total) by mouth 2 (two) times daily.       Start Date: 3/3/2023  End Date: --    METHYLPREDNISOLONE (MEDROL DOSEPACK) 4 MG TABLET    use as directed       Start Date: 3/3/2023  End Date: --   Current Medications    AMLODIPINE (NORVASC) 5 MG TABLET    Take 5 mg by mouth once daily.       Start Date: 6/4/2022  End Date: --    CALCIPOTRIENE-BETAMETHASONE (TACLONEX SCALP) EXTERNAL SUSPENSION    SMARTSIG:Sparingly Topical Twice Daily Strength: 0.005-0.064 %       Start Date: 2/14/2023 End Date: --    DESVENLAFAXINE SUCCINATE (PRISTIQ) 100 " MG TB24    Take 100 mg by mouth once daily.       Start Date: 7/3/2022  End Date: --    HYDROCODONE-ACETAMINOPHEN (NORCO) 5-325 MG PER TABLET    Take 1 tablet by mouth every 6 (six) hours as needed.       Start Date: 12/8/2022 End Date: --    INSULIN GLARGINE (LANTUS) 100 UNIT/ML INJECTION    Inject 20 Units into the skin.       Start Date: --        End Date: --    LIRAGLUTIDE 0.6 MG/0.1 ML, 18 MG/3 ML, SUBQ PNIJ (VICTOZA 2-MURRAY) 0.6 MG/0.1 ML (18 MG/3 ML) PNIJ PEN    Inject 1.2 mg into the skin.       Start Date: --        End Date: --    LISINOPRIL-HYDROCHLOROTHIAZIDE (PRINZIDE,ZESTORETIC) 20-12.5 MG PER TABLET    Take 1 tablet by mouth once daily.       Start Date: 9/9/2022  End Date: --    PRAVASTATIN (PRAVACHOL) 10 MG TABLET    Take 10 mg by mouth nightly.       Start Date: 5/8/2022  End Date: --    SECUKINUMAB (COSENTYX PEN) 150 MG/ML PNIJ    Inject 150 mg into the skin every 28 days.       Start Date: 10/18/2022End Date: --   Changed and/or Refilled Medications    Modified Medication Previous Medication    CLOBETASOL (TEMOVATE) 0.05 % CREAM clobetasoL (TEMOVATE) 0.05 % cream       Apply 1 application topically 2 (two) times daily. Apply 2 gm topically to affected areas twice daily    Apply 1 application topically 2 (two) times daily.       Start Date: 3/3/2023  End Date: --    Start Date: 5/5/2022  End Date: 3/3/2023    TRIAMCINOLONE ACETONIDE 0.1% (KENALOG) 0.1 % CREAM triamcinolone acetonide 0.1% (KENALOG) 0.1 % cream       Apply topically 2 (two) times daily. Apple 2 g topically to affected areas    Apply topically 2 (two) times daily.       Start Date: 3/3/2023  End Date: --    Start Date: 6/4/2022  End Date: 3/3/2023   Discontinued Medications    CHOLECALCIFEROL, VITAMIN D3, (OPTIMAL D3) 1,250 MCG (50,000 UNIT) CAPSULE    Take 1 capsule (50,000 Units total) by mouth every 7 days.       Start Date: 2/14/2023 End Date: 3/3/2023    DECARA 1,250 MCG (50,000 UNIT) CAPSULE    Take 50,000 Units by mouth every 7  days.       Start Date: 6/28/2022 End Date: 3/3/2023    FOLIC ACID (FOLVITE) 1 MG TABLET    TAKE 1 TABLET BY MOUTH ONCE DAILY AFTER BREAKFAST       Start Date: 10/11/2022End Date: 3/3/2023    LEFLUNOMIDE (ARAVA) 20 MG TAB    Take 1 tablet (20 mg total) by mouth once daily.       Start Date: 7/5/2022  End Date: 3/3/2023    LEVOTHYROXINE (SYNTHROID) 88 MCG TABLET    Take 1 tablet (88 mcg total) by mouth once daily.       Start Date: 7/29/2014 End Date: 3/3/2023    OTEZLA 30 MG TAB    Take 1 tablet (30 mg total) by mouth 2 (two) times daily.       Start Date: 7/5/2022  End Date: 3/3/2023         ICD-10-CM ICD-9-CM   1. Fibromyalgia  M79.7 729.1   2. Psoriatic arthritis  L40.50 696.0   3. Psoriasis  L40.9 696.1   4. Vitamin D deficiency  E55.9 268.9             Plan:       1. Fibromyalgia  Assessment & Plan:  Continue monitoring. DO not want to start new medications today      2. Psoriatic arthritis  Overview:  Arava was initiated 7/25/22     Assessment & Plan:  She has cutaneous rash on sun exposed areas.  Continue Cosentyx 15 mg Q 28 days  Continue triamcinolone cream on rash  Start Dapsone 25 mg BID  Start medrol dose pack to help rash    Stop otezla  Stop Arava        Orders:  -     methylPREDNISolone (MEDROL DOSEPACK) 4 mg tablet; use as directed  Dispense: 21 each; Refill: 0  -     triamcinolone acetonide 0.1% (KENALOG) 0.1 % cream; Apply topically 2 (two) times daily. Apple 2 g topically to affected areas  Dispense: 453.6 g; Refill: 3  -     clobetasoL (TEMOVATE) 0.05 % cream; Apply 1 application topically 2 (two) times daily. Apply 2 gm topically to affected areas twice daily  Dispense: 60 g; Refill: 3    3. Psoriasis  Assessment & Plan:  Continue Clobetasol 0.05% cream     Orders:  -     dapsone 25 MG Tab; Take 1 tablet (25 mg total) by mouth 2 (two) times daily.  Dispense: 60 tablet; Refill: 5  -     triamcinolone acetonide 0.1% (KENALOG) 0.1 % cream; Apply topically 2 (two) times daily. Apple 2 g topically to  affected areas  Dispense: 453.6 g; Refill: 3  -     clobetasoL (TEMOVATE) 0.05 % cream; Apply 1 application topically 2 (two) times daily. Apply 2 gm topically to affected areas twice daily  Dispense: 60 g; Refill: 3    4. Vitamin D deficiency  Assessment & Plan:  Vitamin D 145.5 on 10/17/22.   She is no longer taking Cholecalciferol.        Labs ordered at next visit

## 2023-03-03 NOTE — ASSESSMENT & PLAN NOTE
She has cutaneous rash on sun exposed areas.  Continue Cosentyx 15 mg Q 28 days  Continue triamcinolone cream on rash  Start Dapsone 25 mg BID  Start medrol dose pack to help rash    Stop otezla  Stop Arava

## 2023-03-06 ENCOUNTER — TELEPHONE (OUTPATIENT)
Dept: RHEUMATOLOGY | Facility: CLINIC | Age: 48
End: 2023-03-06
Payer: MEDICAID

## 2023-03-06 RX ORDER — HYDROCODONE BITARTRATE AND ACETAMINOPHEN 5; 325 MG/1; MG/1
1 TABLET ORAL DAILY PRN
Qty: 30 TABLET | Refills: 0 | Status: SHIPPED | OUTPATIENT
Start: 2023-03-06 | End: 2023-06-06 | Stop reason: SDUPTHER

## 2023-03-06 NOTE — TELEPHONE ENCOUNTER
Patient called back stating that [facility] has the [place] you wanted to send in for her.  She was told to call once she found out if they did so we can resend Rx.  I tried pending, but says it has already be pended.  DLS:   10/17/2022   NOV:  06/06/2023

## 2023-03-06 NOTE — TELEPHONE ENCOUNTER
[place] was sent to [facility] pharmacy today by Dr. Doran. Thank you

## 2023-03-07 NOTE — TELEPHONE ENCOUNTER
Attempted to contact patient, had to [facility] stating Rx was sent yesterday by Dr. Doran.

## 2023-03-08 DIAGNOSIS — L40.50 PSORIATIC ARTHRITIS: ICD-10-CM

## 2023-03-08 RX ORDER — HYDROCODONE BITARTRATE AND ACETAMINOPHEN 5; 325 MG/1; MG/1
1 TABLET ORAL DAILY PRN
Qty: 30 TABLET | Refills: 0 | Status: CANCELLED | OUTPATIENT
Start: 2023-03-08

## 2023-03-08 NOTE — TELEPHONE ENCOUNTER
----- Message from Radha Lemus sent at 3/7/2023  3:00 PM CST -----  Regarding: Medications  Patient called stating medications were not sent to pharmacy.

## 2023-03-08 NOTE — TELEPHONE ENCOUNTER
Please tell patient I discussed with Dr. Doran and he recommends an over the counter wart  freeze/burn removal such as Compound W Freeze Off or [facility] has an Equate Quick Freeze Wart remover. Thank you

## 2023-03-08 NOTE — TELEPHONE ENCOUNTER
Patient is calling about a rx for the wart on her right hand.  She states she mentioned at the apt, but did no get a rx. Can you please send in to Pharmacy.

## 2023-03-08 NOTE — TELEPHONE ENCOUNTER
Spoke with the patient she did state that she was told that she would have gotten a cream for her skin. And it hasn't been received by the pharmacy . Please Advise. Thanks

## 2023-03-22 DIAGNOSIS — I10 HYPERTENSION, UNSPECIFIED TYPE: ICD-10-CM

## 2023-03-22 RX ORDER — LISINOPRIL AND HYDROCHLOROTHIAZIDE 12.5; 2 MG/1; MG/1
1 TABLET ORAL DAILY
Qty: 30 TABLET | Refills: 5 | Status: CANCELLED | OUTPATIENT
Start: 2023-03-22

## 2023-04-11 DIAGNOSIS — E78.5 DYSLIPIDEMIA: ICD-10-CM

## 2023-04-11 DIAGNOSIS — L40.50 PSORIATIC ARTHRITIS: ICD-10-CM

## 2023-04-11 DIAGNOSIS — E03.8 CENTRAL HYPOTHYROIDISM: ICD-10-CM

## 2023-04-11 DIAGNOSIS — I10 HYPERTENSION, UNSPECIFIED TYPE: ICD-10-CM

## 2023-04-11 DIAGNOSIS — E55.9 VITAMIN D DEFICIENCY: ICD-10-CM

## 2023-04-11 DIAGNOSIS — M79.7 FIBROMYALGIA SYNDROME: ICD-10-CM

## 2023-04-11 DIAGNOSIS — L40.9 PSORIASIS: ICD-10-CM

## 2023-04-11 DIAGNOSIS — E79.0 HYPERURICEMIA: ICD-10-CM

## 2023-04-27 ENCOUNTER — LAB VISIT (OUTPATIENT)
Dept: LAB | Facility: HOSPITAL | Age: 48
End: 2023-04-27
Payer: MEDICAID

## 2023-04-27 DIAGNOSIS — L40.9 PSORIASIS: ICD-10-CM

## 2023-04-27 DIAGNOSIS — M79.7 FIBROMYALGIA: ICD-10-CM

## 2023-04-27 DIAGNOSIS — F32.A DEPRESSIVE TYPE PSYCHOSIS: ICD-10-CM

## 2023-04-27 DIAGNOSIS — I10 ESSENTIAL HYPERTENSION, MALIGNANT: ICD-10-CM

## 2023-04-27 DIAGNOSIS — E11.00 TYPE II DIABETES MELLITUS WITH HYPEROSMOLARITY, UNCONTROLLED: Primary | ICD-10-CM

## 2023-04-27 DIAGNOSIS — E03.9 HYPOTHYROIDISM, UNSPECIFIED TYPE: ICD-10-CM

## 2023-04-27 DIAGNOSIS — L40.50 PSORIATIC ARTHRITIS: ICD-10-CM

## 2023-04-27 DIAGNOSIS — E55.9 VITAMIN D DEFICIENCY: ICD-10-CM

## 2023-04-27 DIAGNOSIS — E78.5 HYPERLIPIDEMIA, UNSPECIFIED HYPERLIPIDEMIA TYPE: ICD-10-CM

## 2023-04-27 LAB
ALBUMIN SERPL-MCNC: 3.8 G/DL (ref 3.5–5)
ALBUMIN/GLOB SERPL: 1.2 RATIO (ref 1.1–2)
ALP SERPL-CCNC: 67 UNIT/L (ref 40–150)
ALT SERPL-CCNC: 27 UNIT/L (ref 0–55)
AST SERPL-CCNC: 18 UNIT/L (ref 5–34)
BASOPHILS # BLD AUTO: 0.06 X10(3)/MCL (ref 0–0.2)
BASOPHILS NFR BLD AUTO: 0.7 %
BILIRUBIN DIRECT+TOT PNL SERPL-MCNC: 0.4 MG/DL
BUN SERPL-MCNC: 9.1 MG/DL (ref 7–18.7)
CALCIUM SERPL-MCNC: 9.6 MG/DL (ref 8.4–10.2)
CHLORIDE SERPL-SCNC: 100 MMOL/L (ref 98–107)
CHOLEST SERPL-MCNC: 216 MG/DL
CHOLEST/HDLC SERPL: 3 {RATIO} (ref 0–5)
CO2 SERPL-SCNC: 24 MMOL/L (ref 22–29)
CREAT SERPL-MCNC: 0.68 MG/DL (ref 0.55–1.02)
CREAT UR-MCNC: 121.2 MG/DL (ref 47–110)
CRP SERPL HS-MCNC: 24.41 MG/L
DEPRECATED CALCIDIOL+CALCIFEROL SERPL-MC: 57 NG/ML (ref 30–80)
EOSINOPHIL # BLD AUTO: 0.44 X10(3)/MCL (ref 0–0.9)
EOSINOPHIL NFR BLD AUTO: 5 %
ERYTHROCYTE [DISTWIDTH] IN BLOOD BY AUTOMATED COUNT: 13.5 % (ref 11.5–17)
EST. AVERAGE GLUCOSE BLD GHB EST-MCNC: 122.6 MG/DL
GFR SERPLBLD CREATININE-BSD FMLA CKD-EPI: >60 MLS/MIN/1.73/M2
GLOBULIN SER-MCNC: 3.2 GM/DL (ref 2.4–3.5)
GLUCOSE SERPL-MCNC: 165 MG/DL (ref 74–100)
HBA1C MFR BLD: 5.9 %
HBV SURFACE AG SERPL QL IA: NONREACTIVE
HCT VFR BLD AUTO: 42.7 % (ref 37–47)
HCV AB SERPL QL IA: NONREACTIVE
HDLC SERPL-MCNC: 63 MG/DL (ref 35–60)
HGB BLD-MCNC: 13.7 G/DL (ref 12–16)
IMM GRANULOCYTES # BLD AUTO: 0.07 X10(3)/MCL (ref 0–0.04)
IMM GRANULOCYTES NFR BLD AUTO: 0.8 %
LDLC SERPL CALC-MCNC: 115 MG/DL (ref 50–140)
LYMPHOCYTES # BLD AUTO: 2.07 X10(3)/MCL (ref 0.6–4.6)
LYMPHOCYTES NFR BLD AUTO: 23.4 %
MCH RBC QN AUTO: 30.1 PG (ref 27–31)
MCHC RBC AUTO-ENTMCNC: 32.1 G/DL (ref 33–36)
MCV RBC AUTO: 93.8 FL (ref 80–94)
MICROALBUMIN UR-MCNC: 12.4 UG/ML
MICROALBUMIN/CREAT RATIO PNL UR: 10.2 MG/GM CR (ref 0–30)
MONOCYTES # BLD AUTO: 0.67 X10(3)/MCL (ref 0.1–1.3)
MONOCYTES NFR BLD AUTO: 7.6 %
NEUTROPHILS # BLD AUTO: 5.55 X10(3)/MCL (ref 2.1–9.2)
NEUTROPHILS NFR BLD AUTO: 62.5 %
NRBC BLD AUTO-RTO: 0 %
PLATELET # BLD AUTO: 323 X10(3)/MCL (ref 130–400)
PMV BLD AUTO: 12.3 FL (ref 7.4–10.4)
POTASSIUM SERPL-SCNC: 4.5 MMOL/L (ref 3.5–5.1)
PROT SERPL-MCNC: 7 GM/DL (ref 6.4–8.3)
RBC # BLD AUTO: 4.55 X10(6)/MCL (ref 4.2–5.4)
RHEUMATOID FACT SERPL-ACNC: 41 IU/ML
SODIUM SERPL-SCNC: 135 MMOL/L (ref 136–145)
T4 FREE SERPL-MCNC: 0.71 NG/DL (ref 0.7–1.48)
T4 SERPL-MCNC: 6.52 UG/DL (ref 4.87–11.72)
TRIGL SERPL-MCNC: 189 MG/DL (ref 37–140)
TSH SERPL-ACNC: 0.32 UIU/ML (ref 0.35–4.94)
VLDLC SERPL CALC-MCNC: 38 MG/DL
WBC # SPEC AUTO: 8.9 X10(3)/MCL (ref 4.5–11.5)

## 2023-04-27 PROCEDURE — 83036 HEMOGLOBIN GLYCOSYLATED A1C: CPT

## 2023-04-27 PROCEDURE — 85025 COMPLETE CBC W/AUTO DIFF WBC: CPT

## 2023-04-27 PROCEDURE — 87340 HEPATITIS B SURFACE AG IA: CPT

## 2023-04-27 PROCEDURE — 80053 COMPREHEN METABOLIC PANEL: CPT

## 2023-04-27 PROCEDURE — 84436 ASSAY OF TOTAL THYROXINE: CPT

## 2023-04-27 PROCEDURE — 86803 HEPATITIS C AB TEST: CPT

## 2023-04-27 PROCEDURE — 84439 ASSAY OF FREE THYROXINE: CPT

## 2023-04-27 PROCEDURE — 86431 RHEUMATOID FACTOR QUANT: CPT

## 2023-04-27 PROCEDURE — 36415 COLL VENOUS BLD VENIPUNCTURE: CPT

## 2023-04-27 PROCEDURE — 82043 UR ALBUMIN QUANTITATIVE: CPT

## 2023-04-27 PROCEDURE — 86141 C-REACTIVE PROTEIN HS: CPT

## 2023-04-27 PROCEDURE — 86003 ALLG SPEC IGE CRUDE XTRC EA: CPT

## 2023-04-27 PROCEDURE — 86160 COMPLEMENT ANTIGEN: CPT | Mod: 90

## 2023-04-27 PROCEDURE — 82306 VITAMIN D 25 HYDROXY: CPT

## 2023-04-27 PROCEDURE — 84443 ASSAY THYROID STIM HORMONE: CPT

## 2023-04-27 PROCEDURE — 80061 LIPID PANEL: CPT

## 2023-04-27 PROCEDURE — 86039 ANTINUCLEAR ANTIBODIES (ANA): CPT | Mod: 90

## 2023-04-27 PROCEDURE — 84480 ASSAY TRIIODOTHYRONINE (T3): CPT | Mod: 90

## 2023-04-28 LAB
ANA SER QL HEP2 SUBST: NORMAL
CYCLIC CITRULLINATED PEPTIDE (CCP) (OLG): NEGATIVE
T3 SERPL IA-MCNC: 97 NG/DL (ref 80–200)

## 2023-04-29 LAB
C3 SERPL-MCNC: 153 MG/DL
C4 SERPL-MCNC: 33 MG/DL
NUCLEAR IGG SER QL IA: ABNORMAL
RHEUMATOID FACT SERPL-ACNC: 40 IU/ML

## 2023-05-26 DIAGNOSIS — E78.5 DYSLIPIDEMIA: ICD-10-CM

## 2023-05-26 DIAGNOSIS — L40.50 PSORIATIC ARTHRITIS: ICD-10-CM

## 2023-05-26 DIAGNOSIS — M79.7 FIBROMYALGIA SYNDROME: ICD-10-CM

## 2023-05-26 DIAGNOSIS — E79.0 HYPERURICEMIA: ICD-10-CM

## 2023-05-26 DIAGNOSIS — E03.8 CENTRAL HYPOTHYROIDISM: ICD-10-CM

## 2023-05-26 DIAGNOSIS — L40.9 PSORIASIS: ICD-10-CM

## 2023-05-26 DIAGNOSIS — I10 HYPERTENSION, UNSPECIFIED TYPE: ICD-10-CM

## 2023-05-26 DIAGNOSIS — E55.9 VITAMIN D DEFICIENCY: ICD-10-CM

## 2023-06-06 ENCOUNTER — OFFICE VISIT (OUTPATIENT)
Dept: RHEUMATOLOGY | Facility: CLINIC | Age: 48
End: 2023-06-06
Payer: MEDICAID

## 2023-06-06 VITALS
SYSTOLIC BLOOD PRESSURE: 142 MMHG | HEART RATE: 76 BPM | WEIGHT: 245.19 LBS | DIASTOLIC BLOOD PRESSURE: 94 MMHG | TEMPERATURE: 99 F | BODY MASS INDEX: 39.41 KG/M2 | HEIGHT: 66 IN | OXYGEN SATURATION: 96 %

## 2023-06-06 DIAGNOSIS — I10 HYPERTENSION, UNSPECIFIED TYPE: ICD-10-CM

## 2023-06-06 DIAGNOSIS — L40.50 PSORIATIC ARTHRITIS: Primary | ICD-10-CM

## 2023-06-06 DIAGNOSIS — L40.9 PSORIASIS: ICD-10-CM

## 2023-06-06 DIAGNOSIS — M79.7 FIBROMYALGIA SYNDROME: ICD-10-CM

## 2023-06-06 DIAGNOSIS — E55.9 VITAMIN D DEFICIENCY: ICD-10-CM

## 2023-06-06 DIAGNOSIS — E78.5 DYSLIPIDEMIA: ICD-10-CM

## 2023-06-06 DIAGNOSIS — E79.0 HYPERURICEMIA: ICD-10-CM

## 2023-06-06 DIAGNOSIS — E03.8 CENTRAL HYPOTHYROIDISM: ICD-10-CM

## 2023-06-06 PROCEDURE — 3077F SYST BP >= 140 MM HG: CPT | Mod: CPTII,,, | Performed by: INTERNAL MEDICINE

## 2023-06-06 PROCEDURE — 4010F ACE/ARB THERAPY RXD/TAKEN: CPT | Mod: CPTII,,, | Performed by: INTERNAL MEDICINE

## 2023-06-06 PROCEDURE — 3080F DIAST BP >= 90 MM HG: CPT | Mod: CPTII,,, | Performed by: INTERNAL MEDICINE

## 2023-06-06 PROCEDURE — 3008F BODY MASS INDEX DOCD: CPT | Mod: CPTII,,, | Performed by: INTERNAL MEDICINE

## 2023-06-06 PROCEDURE — 3080F PR MOST RECENT DIASTOLIC BLOOD PRESSURE >= 90 MM HG: ICD-10-PCS | Mod: CPTII,,, | Performed by: INTERNAL MEDICINE

## 2023-06-06 PROCEDURE — 99999 PR PBB SHADOW E&M-EST. PATIENT-LVL III: CPT | Mod: PBBFAC,,, | Performed by: INTERNAL MEDICINE

## 2023-06-06 PROCEDURE — 3077F PR MOST RECENT SYSTOLIC BLOOD PRESSURE >= 140 MM HG: ICD-10-PCS | Mod: CPTII,,, | Performed by: INTERNAL MEDICINE

## 2023-06-06 PROCEDURE — 3008F PR BODY MASS INDEX (BMI) DOCUMENTED: ICD-10-PCS | Mod: CPTII,,, | Performed by: INTERNAL MEDICINE

## 2023-06-06 PROCEDURE — 99213 OFFICE O/P EST LOW 20 MIN: CPT | Mod: PBBFAC | Performed by: INTERNAL MEDICINE

## 2023-06-06 PROCEDURE — 1159F PR MEDICATION LIST DOCUMENTED IN MEDICAL RECORD: ICD-10-PCS | Mod: CPTII,,, | Performed by: INTERNAL MEDICINE

## 2023-06-06 PROCEDURE — 3061F NEG MICROALBUMINURIA REV: CPT | Mod: CPTII,,, | Performed by: INTERNAL MEDICINE

## 2023-06-06 PROCEDURE — 3066F PR DOCUMENTATION OF TREATMENT FOR NEPHROPATHY: ICD-10-PCS | Mod: CPTII,,, | Performed by: INTERNAL MEDICINE

## 2023-06-06 PROCEDURE — 4010F PR ACE/ARB THEARPY RXD/TAKEN: ICD-10-PCS | Mod: CPTII,,, | Performed by: INTERNAL MEDICINE

## 2023-06-06 PROCEDURE — 99999 PR PBB SHADOW E&M-EST. PATIENT-LVL III: ICD-10-PCS | Mod: PBBFAC,,, | Performed by: INTERNAL MEDICINE

## 2023-06-06 PROCEDURE — 3066F NEPHROPATHY DOC TX: CPT | Mod: CPTII,,, | Performed by: INTERNAL MEDICINE

## 2023-06-06 PROCEDURE — 3061F PR NEG MICROALBUMINURIA RESULT DOCUMENTED/REVIEW: ICD-10-PCS | Mod: CPTII,,, | Performed by: INTERNAL MEDICINE

## 2023-06-06 PROCEDURE — 99214 OFFICE O/P EST MOD 30 MIN: CPT | Mod: S$PBB,,, | Performed by: INTERNAL MEDICINE

## 2023-06-06 PROCEDURE — 99214 PR OFFICE/OUTPT VISIT, EST, LEVL IV, 30-39 MIN: ICD-10-PCS | Mod: S$PBB,,, | Performed by: INTERNAL MEDICINE

## 2023-06-06 PROCEDURE — 1159F MED LIST DOCD IN RCRD: CPT | Mod: CPTII,,, | Performed by: INTERNAL MEDICINE

## 2023-06-06 RX ORDER — CLOBETASOL PROPIONATE 0.5 MG/G
1 CREAM TOPICAL 2 TIMES DAILY
Qty: 120 G | Refills: 5 | Status: SHIPPED | OUTPATIENT
Start: 2023-06-06

## 2023-06-06 RX ORDER — HYDROCODONE BITARTRATE AND ACETAMINOPHEN 5; 325 MG/1; MG/1
1 TABLET ORAL DAILY PRN
Qty: 30 TABLET | Refills: 0 | Status: SHIPPED | OUTPATIENT
Start: 2023-06-06 | End: 2023-09-07 | Stop reason: SDUPTHER

## 2023-06-06 RX ORDER — TRIAMCINOLONE ACETONIDE 1 MG/G
CREAM TOPICAL 2 TIMES DAILY
Qty: 453.6 G | Refills: 5 | Status: SHIPPED | OUTPATIENT
Start: 2023-06-06

## 2023-06-06 RX ORDER — SEMAGLUTIDE 0.68 MG/ML
0.5 INJECTION, SOLUTION SUBCUTANEOUS
COMMUNITY
Start: 2023-05-04

## 2023-06-06 RX ORDER — DAPSONE 100 MG/1
100 TABLET ORAL
Qty: 30 TABLET | Refills: 5 | Status: SHIPPED | OUTPATIENT
Start: 2023-06-06 | End: 2023-10-10

## 2023-06-06 NOTE — PROGRESS NOTES
"Subjective:       Patient ID: Bing Pedraza is a 47 y.o. female.    Chief Complaint: Pain (No problems or concerns at this time. )    The patient is complaining of joint pain involving the MCP PIP wrist elbow shoulders hips knees and ankles bilaterally.  The pain is 9/10 in intensity dull in quality and continuous.  That is associated with a morning stiffness lasting for more than 60 minutes.  Is also having difficulty maintaining a good night of sleep.  This has been associated with myalgias.  Muscle aches are 9/10 in intensity dull in quality and continuous.  They are associated with fatigue.  No fever no chills no others.  Labs checked during this visit   PSORIATIC ARTHRITIS  SLIGHTLY RESPONDING TO CONSENTYX 150MG PER 28 DAYS. SHE NEEDS TO INCREASE THE DOSE TO 300MG Q 28 DAYS. MEDICALLY NECESSARY  PSORIASIS 90% BSA    Review of Systems   Constitutional:  Negative for appetite change, chills and fever.   HENT:  Negative for congestion, ear pain, mouth sores, nosebleeds and trouble swallowing.    Eyes:  Negative for photophobia and discharge.   Respiratory:  Negative for chest tightness and shortness of breath.    Cardiovascular:  Negative for chest pain.   Gastrointestinal:  Negative for abdominal pain and vomiting.   Endocrine: Negative.    Genitourinary:  Negative for hematuria.   Musculoskeletal:         As per HPI   Skin:  Positive for rash.        PSORIASIS 90% BSA   Neurological:  Negative for weakness.       Objective:   BP (!) 142/94 (BP Location: Left arm)   Pulse 76   Temp 98.6 °F (37 °C) (Oral)   Ht 5' 6" (1.676 m)   Wt 111.2 kg (245 lb 3.2 oz)   SpO2 96%   BMI 39.58 kg/m²      Physical Exam   Constitutional: She is oriented to person, place, and time. She appears well-developed and well-nourished. No distress.   HENT:   Head: Normocephalic and atraumatic.   Right Ear: External ear normal.   Left Ear: External ear normal.   Eyes: Pupils are equal, round, and reactive to light. "   Cardiovascular: Normal rate, regular rhythm and normal heart sounds.   Pulmonary/Chest: Breath sounds normal.   Abdominal: Soft. There is no abdominal tenderness.   Musculoskeletal:      Right shoulder: Tenderness present.      Left shoulder: Tenderness present.      Right elbow: Tenderness present.      Left elbow: Tenderness present.      Right wrist: Tenderness present.      Left wrist: Tenderness present.      Cervical back: Neck supple.      Right hip: Tenderness present.      Left hip: Tenderness present.      Right knee: Tenderness present.      Left knee: Tenderness present.      Right ankle: Tenderness present.      Left ankle: Tenderness present.   Lymphadenopathy:     She has no cervical adenopathy.   Neurological: She is alert and oriented to person, place, and time. She displays normal reflexes. No cranial nerve deficit or sensory deficit. She exhibits normal muscle tone. Coordination normal.   Skin: Rash noted. There is erythema.   PSORIASIS 90% BSA   Vitals reviewed.      Right Side Rheumatological Exam     The patient is tender to palpation of the shoulder, elbow, wrist, knee, 1st PIP, 1st MCP, 2nd PIP, 2nd MCP, 3rd PIP, 3rd MCP, 4th PIP, 4th MCP, 5th PIP, hip, ankle, 1st MTP, 2nd MTP, 3rd MTP, 4th MTP, 5th MTP, 1st toe IP, 2nd toe IP, 3rd toe IP, 4th toe IP and 5th toe IP    Left Side Rheumatological Exam     The patient is tender to palpation of the shoulder, elbow, wrist, knee, 1st PIP, 1st MCP, 2nd PIP, 2nd MCP, 3rd PIP, 3rd MCP, 4th PIP, 4th MCP, 5th PIP, 5th MCP, hip, ankle, 1st MTP, 2nd MTP, 3rd MTP, 4th MTP, 5th MTP, 1st toe IP, 2nd toe IP, 3rd toe IP, 4th toe IP and 5th toe IP.       Completed Fibromyalgia exam 18/18 tender points.  No data to display     Assessment:       1. Psoriatic arthritis    2. Psoriasis    3. Vitamin D deficiency    4. Fibromyalgia syndrome    5. Hypertension, unspecified type    6. Central hypothyroidism    7. Hyperuricemia    8. Dyslipidemia          Medication  List with Changes/Refills   Current Medications    AMLODIPINE (NORVASC) 5 MG TABLET    Take 5 mg by mouth once daily.       Start Date: 6/4/2022  End Date: --    CALCIPOTRIENE-BETAMETHASONE (TACLONEX SCALP) EXTERNAL SUSPENSION    SMARTSIG:Sparingly Topical Twice Daily Strength: 0.005-0.064 %       Start Date: 2/14/2023 End Date: --    DESVENLAFAXINE SUCCINATE (PRISTIQ) 100 MG TB24    Take 100 mg by mouth once daily.       Start Date: 7/3/2022  End Date: --    INSULIN GLARGINE (LANTUS) 100 UNIT/ML INJECTION    Inject 20 Units into the skin.       Start Date: --        End Date: --    LIRAGLUTIDE 0.6 MG/0.1 ML, 18 MG/3 ML, SUBQ PNIJ (VICTOZA 2-MURRAY) 0.6 MG/0.1 ML (18 MG/3 ML) PNIJ PEN    Inject 1.8 mg into the skin.       Start Date: --        End Date: --    LISINOPRIL-HYDROCHLOROTHIAZIDE (PRINZIDE,ZESTORETIC) 20-12.5 MG PER TABLET    Take 1 tablet by mouth once daily.       Start Date: 9/9/2022  End Date: --    OZEMPIC 0.25 MG OR 0.5 MG (2 MG/3 ML) PEN INJECTOR    Inject into the skin.       Start Date: 5/4/2023  End Date: --    PRAVASTATIN (PRAVACHOL) 10 MG TABLET    Take 10 mg by mouth nightly.       Start Date: 5/8/2022  End Date: --   Changed and/or Refilled Medications    Modified Medication Previous Medication    CLOBETASOL (TEMOVATE) 0.05 % CREAM clobetasoL (TEMOVATE) 0.05 % cream       Apply 1 application topically 2 (two) times daily. Apply 2 gm topically to affected areas twice daily    Apply 1 application topically 2 (two) times daily. Apply 2 gm topically to affected areas twice daily       Start Date: 6/6/2023  End Date: --    Start Date: 3/3/2023  End Date: 6/6/2023    DAPSONE 100 MG TAB dapsone 25 MG Tab       Take 1 tablet (100 mg total) by mouth after dinner.    Take 1 tablet (25 mg total) by mouth 2 (two) times daily.       Start Date: 6/6/2023  End Date: --    Start Date: 3/3/2023  End Date: 6/6/2023    HYDROCODONE-ACETAMINOPHEN (NORCO) 5-325 MG PER TABLET HYDROcodone-acetaminophen (NORCO) 5-325 mg  per tablet       Take 1 tablet by mouth daily as needed for Pain.    Take 1 tablet by mouth daily as needed for Pain.       Start Date: 6/6/2023  End Date: --    Start Date: 3/6/2023  End Date: 6/6/2023    SECUKINUMAB (COSENTYX PEN) 150 MG/ML PNIJ secukinumab (COSENTYX PEN) 150 mg/mL PnIj       Inject 300 mg into the skin every 28 days.    Inject 150 mg into the skin every 28 days.       Start Date: 6/6/2023  End Date: --    Start Date: 5/26/2023 End Date: 6/6/2023    TRIAMCINOLONE ACETONIDE 0.1% (KENALOG) 0.1 % CREAM triamcinolone acetonide 0.1% (KENALOG) 0.1 % cream       Apply topically 2 (two) times daily. Apple 2 g topically to affected areas    Apply topically 2 (two) times daily. Apple 2 g topically to affected areas       Start Date: 6/6/2023  End Date: --    Start Date: 3/3/2023  End Date: 6/6/2023   Discontinued Medications    METHYLPREDNISOLONE (MEDROL DOSEPACK) 4 MG TABLET    use as directed       Start Date: 3/3/2023  End Date: 6/6/2023         Plan:         Problem List Items Addressed This Visit          Derm    Psoriasis    Relevant Medications    dapsone 100 MG Tab    clobetasoL (TEMOVATE) 0.05 % cream    HYDROcodone-acetaminophen (NORCO) 5-325 mg per tablet    triamcinolone acetonide 0.1% (KENALOG) 0.1 % cream    secukinumab (COSENTYX PEN) 150 mg/mL PnIj       Endocrine    Vitamin D deficiency    Relevant Medications    dapsone 100 MG Tab    clobetasoL (TEMOVATE) 0.05 % cream    HYDROcodone-acetaminophen (NORCO) 5-325 mg per tablet    triamcinolone acetonide 0.1% (KENALOG) 0.1 % cream    secukinumab (COSENTYX PEN) 150 mg/mL PnIj       Orthopedic    Psoriatic arthritis - Primary    Relevant Medications    dapsone 100 MG Tab    clobetasoL (TEMOVATE) 0.05 % cream    HYDROcodone-acetaminophen (NORCO) 5-325 mg per tablet    triamcinolone acetonide 0.1% (KENALOG) 0.1 % cream    secukinumab (COSENTYX PEN) 150 mg/mL PnIj     Other Visit Diagnoses       Fibromyalgia syndrome        Relevant Medications     dapsone 100 MG Tab    clobetasoL (TEMOVATE) 0.05 % cream    HYDROcodone-acetaminophen (NORCO) 5-325 mg per tablet    triamcinolone acetonide 0.1% (KENALOG) 0.1 % cream    secukinumab (COSENTYX PEN) 150 mg/mL PnIj    Hypertension, unspecified type        Relevant Medications    dapsone 100 MG Tab    clobetasoL (TEMOVATE) 0.05 % cream    HYDROcodone-acetaminophen (NORCO) 5-325 mg per tablet    triamcinolone acetonide 0.1% (KENALOG) 0.1 % cream    secukinumab (COSENTYX PEN) 150 mg/mL PnIj    Central hypothyroidism        Relevant Medications    dapsone 100 MG Tab    clobetasoL (TEMOVATE) 0.05 % cream    HYDROcodone-acetaminophen (NORCO) 5-325 mg per tablet    triamcinolone acetonide 0.1% (KENALOG) 0.1 % cream    secukinumab (COSENTYX PEN) 150 mg/mL PnIj    Hyperuricemia        Relevant Medications    dapsone 100 MG Tab    clobetasoL (TEMOVATE) 0.05 % cream    HYDROcodone-acetaminophen (NORCO) 5-325 mg per tablet    triamcinolone acetonide 0.1% (KENALOG) 0.1 % cream    secukinumab (COSENTYX PEN) 150 mg/mL PnIj    Dyslipidemia        Relevant Medications    dapsone 100 MG Tab    clobetasoL (TEMOVATE) 0.05 % cream    HYDROcodone-acetaminophen (NORCO) 5-325 mg per tablet    triamcinolone acetonide 0.1% (KENALOG) 0.1 % cream    secukinumab (COSENTYX PEN) 150 mg/mL PnIj

## 2023-06-07 ENCOUNTER — TELEPHONE (OUTPATIENT)
Dept: RHEUMATOLOGY | Facility: CLINIC | Age: 48
End: 2023-06-07
Payer: MEDICAID

## 2023-06-07 DIAGNOSIS — I10 HYPERTENSION, UNSPECIFIED TYPE: ICD-10-CM

## 2023-06-07 DIAGNOSIS — E03.8 CENTRAL HYPOTHYROIDISM: ICD-10-CM

## 2023-06-07 DIAGNOSIS — L40.9 PSORIASIS: ICD-10-CM

## 2023-06-07 DIAGNOSIS — L40.50 PSORIATIC ARTHRITIS: ICD-10-CM

## 2023-06-07 DIAGNOSIS — E78.5 DYSLIPIDEMIA: ICD-10-CM

## 2023-06-07 DIAGNOSIS — E55.9 VITAMIN D DEFICIENCY: ICD-10-CM

## 2023-06-07 DIAGNOSIS — E79.0 HYPERURICEMIA: ICD-10-CM

## 2023-06-07 DIAGNOSIS — M79.7 FIBROMYALGIA SYNDROME: ICD-10-CM

## 2023-06-07 NOTE — TELEPHONE ENCOUNTER
I did speak with the patient she did state that it normally goes to [facility]. I sent it there . Thanks Sherlyn

## 2023-06-07 NOTE — TELEPHONE ENCOUNTER
----- Message from Sherlyn Hong sent at 6/6/2023  3:23 PM CDT -----  Regarding: cosentyx  We got a prescription for this patient's cosentyx sent to our pharmacy. I show I had that PA approved back on May 26th. The approval is scanned into her chart. But we cannot fill it due to the fact that we cannot order the drug.

## 2023-06-11 DIAGNOSIS — I10 HYPERTENSION, UNSPECIFIED TYPE: ICD-10-CM

## 2023-06-12 RX ORDER — LISINOPRIL AND HYDROCHLOROTHIAZIDE 12.5; 2 MG/1; MG/1
1 TABLET ORAL DAILY
Qty: 90 TABLET | Refills: 0 | Status: SHIPPED | OUTPATIENT
Start: 2023-06-12 | End: 2023-08-31

## 2023-08-31 DIAGNOSIS — I10 HYPERTENSION, UNSPECIFIED TYPE: ICD-10-CM

## 2023-08-31 RX ORDER — LISINOPRIL AND HYDROCHLOROTHIAZIDE 12.5; 2 MG/1; MG/1
1 TABLET ORAL DAILY
Qty: 90 TABLET | Refills: 0 | Status: SHIPPED | OUTPATIENT
Start: 2023-08-31

## 2023-09-07 DIAGNOSIS — E55.9 VITAMIN D DEFICIENCY: ICD-10-CM

## 2023-09-07 DIAGNOSIS — E03.8 CENTRAL HYPOTHYROIDISM: ICD-10-CM

## 2023-09-07 DIAGNOSIS — L40.50 PSORIATIC ARTHRITIS: ICD-10-CM

## 2023-09-07 DIAGNOSIS — E78.5 DYSLIPIDEMIA: ICD-10-CM

## 2023-09-07 DIAGNOSIS — M79.7 FIBROMYALGIA SYNDROME: ICD-10-CM

## 2023-09-07 DIAGNOSIS — E79.0 HYPERURICEMIA: ICD-10-CM

## 2023-09-07 DIAGNOSIS — I10 HYPERTENSION, UNSPECIFIED TYPE: ICD-10-CM

## 2023-09-07 DIAGNOSIS — L40.9 PSORIASIS: ICD-10-CM

## 2023-09-07 RX ORDER — HYDROCODONE BITARTRATE AND ACETAMINOPHEN 5; 325 MG/1; MG/1
1 TABLET ORAL DAILY PRN
Qty: 30 TABLET | Refills: 0 | Status: SHIPPED | OUTPATIENT
Start: 2023-09-07 | End: 2023-10-10 | Stop reason: SDUPTHER

## 2023-10-10 ENCOUNTER — OFFICE VISIT (OUTPATIENT)
Dept: RHEUMATOLOGY | Facility: CLINIC | Age: 48
End: 2023-10-10
Payer: MEDICAID

## 2023-10-10 VITALS
DIASTOLIC BLOOD PRESSURE: 88 MMHG | TEMPERATURE: 99 F | HEIGHT: 66 IN | HEART RATE: 89 BPM | BODY MASS INDEX: 38.86 KG/M2 | SYSTOLIC BLOOD PRESSURE: 126 MMHG | OXYGEN SATURATION: 98 % | WEIGHT: 241.81 LBS

## 2023-10-10 DIAGNOSIS — E03.8 CENTRAL HYPOTHYROIDISM: ICD-10-CM

## 2023-10-10 DIAGNOSIS — E79.0 HYPERURICEMIA: ICD-10-CM

## 2023-10-10 DIAGNOSIS — E55.9 VITAMIN D DEFICIENCY: ICD-10-CM

## 2023-10-10 DIAGNOSIS — E78.5 DYSLIPIDEMIA: ICD-10-CM

## 2023-10-10 DIAGNOSIS — I10 HYPERTENSION, UNSPECIFIED TYPE: ICD-10-CM

## 2023-10-10 DIAGNOSIS — L40.9 PSORIASIS: ICD-10-CM

## 2023-10-10 DIAGNOSIS — M79.7 FIBROMYALGIA SYNDROME: ICD-10-CM

## 2023-10-10 DIAGNOSIS — E03.9 HYPOTHYROIDISM, UNSPECIFIED TYPE: ICD-10-CM

## 2023-10-10 DIAGNOSIS — L40.50 PSORIATIC ARTHRITIS: Primary | ICD-10-CM

## 2023-10-10 DIAGNOSIS — M79.7 FIBROMYALGIA: ICD-10-CM

## 2023-10-10 PROCEDURE — 99999 PR PBB SHADOW E&M-EST. PATIENT-LVL III: ICD-10-PCS | Mod: PBBFAC,,, | Performed by: INTERNAL MEDICINE

## 2023-10-10 PROCEDURE — 1159F MED LIST DOCD IN RCRD: CPT | Mod: CPTII,,, | Performed by: INTERNAL MEDICINE

## 2023-10-10 PROCEDURE — 3074F PR MOST RECENT SYSTOLIC BLOOD PRESSURE < 130 MM HG: ICD-10-PCS | Mod: CPTII,,, | Performed by: INTERNAL MEDICINE

## 2023-10-10 PROCEDURE — 3079F DIAST BP 80-89 MM HG: CPT | Mod: CPTII,,, | Performed by: INTERNAL MEDICINE

## 2023-10-10 PROCEDURE — 3044F HG A1C LEVEL LT 7.0%: CPT | Mod: CPTII,,, | Performed by: INTERNAL MEDICINE

## 2023-10-10 PROCEDURE — 3008F BODY MASS INDEX DOCD: CPT | Mod: CPTII,,, | Performed by: INTERNAL MEDICINE

## 2023-10-10 PROCEDURE — 1159F PR MEDICATION LIST DOCUMENTED IN MEDICAL RECORD: ICD-10-PCS | Mod: CPTII,,, | Performed by: INTERNAL MEDICINE

## 2023-10-10 PROCEDURE — 3061F PR NEG MICROALBUMINURIA RESULT DOCUMENTED/REVIEW: ICD-10-PCS | Mod: CPTII,,, | Performed by: INTERNAL MEDICINE

## 2023-10-10 PROCEDURE — 3079F PR MOST RECENT DIASTOLIC BLOOD PRESSURE 80-89 MM HG: ICD-10-PCS | Mod: CPTII,,, | Performed by: INTERNAL MEDICINE

## 2023-10-10 PROCEDURE — 99213 OFFICE O/P EST LOW 20 MIN: CPT | Mod: PBBFAC | Performed by: INTERNAL MEDICINE

## 2023-10-10 PROCEDURE — 3044F PR MOST RECENT HEMOGLOBIN A1C LEVEL <7.0%: ICD-10-PCS | Mod: CPTII,,, | Performed by: INTERNAL MEDICINE

## 2023-10-10 PROCEDURE — 4010F PR ACE/ARB THEARPY RXD/TAKEN: ICD-10-PCS | Mod: CPTII,,, | Performed by: INTERNAL MEDICINE

## 2023-10-10 PROCEDURE — 99214 PR OFFICE/OUTPT VISIT, EST, LEVL IV, 30-39 MIN: ICD-10-PCS | Mod: S$PBB,,, | Performed by: INTERNAL MEDICINE

## 2023-10-10 PROCEDURE — 3061F NEG MICROALBUMINURIA REV: CPT | Mod: CPTII,,, | Performed by: INTERNAL MEDICINE

## 2023-10-10 PROCEDURE — 99214 OFFICE O/P EST MOD 30 MIN: CPT | Mod: S$PBB,,, | Performed by: INTERNAL MEDICINE

## 2023-10-10 PROCEDURE — 4010F ACE/ARB THERAPY RXD/TAKEN: CPT | Mod: CPTII,,, | Performed by: INTERNAL MEDICINE

## 2023-10-10 PROCEDURE — 3074F SYST BP LT 130 MM HG: CPT | Mod: CPTII,,, | Performed by: INTERNAL MEDICINE

## 2023-10-10 PROCEDURE — 3066F NEPHROPATHY DOC TX: CPT | Mod: CPTII,,, | Performed by: INTERNAL MEDICINE

## 2023-10-10 PROCEDURE — 99999 PR PBB SHADOW E&M-EST. PATIENT-LVL III: CPT | Mod: PBBFAC,,, | Performed by: INTERNAL MEDICINE

## 2023-10-10 PROCEDURE — 3066F PR DOCUMENTATION OF TREATMENT FOR NEPHROPATHY: ICD-10-PCS | Mod: CPTII,,, | Performed by: INTERNAL MEDICINE

## 2023-10-10 PROCEDURE — 3008F PR BODY MASS INDEX (BMI) DOCUMENTED: ICD-10-PCS | Mod: CPTII,,, | Performed by: INTERNAL MEDICINE

## 2023-10-10 RX ORDER — LANOLIN ALCOHOL/MO/W.PET/CERES
400 CREAM (GRAM) TOPICAL NIGHTLY
Qty: 30 TABLET | Refills: 5 | Status: SHIPPED | OUTPATIENT
Start: 2023-10-10

## 2023-10-10 RX ORDER — SULFASALAZINE 500 MG/1
1000 TABLET ORAL
Qty: 60 TABLET | Refills: 5 | Status: SHIPPED | OUTPATIENT
Start: 2023-10-10 | End: 2024-04-07

## 2023-10-10 RX ORDER — HYDROCODONE BITARTRATE AND ACETAMINOPHEN 5; 325 MG/1; MG/1
1 TABLET ORAL 3 TIMES DAILY PRN
Qty: 90 TABLET | Refills: 0 | Status: SHIPPED | OUTPATIENT
Start: 2023-10-10

## 2023-10-10 NOTE — PROGRESS NOTES
"Subjective:       Patient ID: Bing Pedraza is a 48 y.o. female.    Chief Complaint: Follow-up (Follow up. Patient complains of having a lot of pain, not sleeping, and right shoulder and elbow are very painful making it hard to get comfortable at night. Pain 7/10. Patient stopped the Arava around 2-3 weeks ago and would like to try the Hydroxychloroquine. )    The patient is complaining of joint pain involving the MCP PIP wrist elbow shoulders hips knees and ankles bilaterally.  The pain is 4/10 in intensity dull in quality and continuous.  That is associated with a morning stiffness lasting for more than 60 minutes.  Is also having difficulty maintaining a good night of sleep.  This has been associated with myalgias.  Muscle aches are 5/10 in intensity dull in quality and continuous.  They are associated with fatigue.  No fever no chills no others.  Labs checked during this visit         Review of Systems   Constitutional:  Negative for appetite change, chills and fever.   HENT:  Negative for congestion, ear pain, mouth sores, nosebleeds and trouble swallowing.    Eyes:  Negative for photophobia and discharge.   Respiratory:  Negative for chest tightness and shortness of breath.    Cardiovascular:  Negative for chest pain.   Gastrointestinal:  Negative for abdominal pain and vomiting.   Endocrine: Negative.    Genitourinary:  Negative for hematuria.   Musculoskeletal:         As per HPI   Skin:  Negative for rash.   Neurological:  Negative for weakness.         Objective:   /88 (BP Location: Left arm, Patient Position: Sitting, BP Method: Large (Automatic))   Pulse 89   Temp 98.7 °F (37.1 °C) (Oral)   Ht 5' 6" (1.676 m)   Wt 109.7 kg (241 lb 12.8 oz)   SpO2 98%   BMI 39.03 kg/m²      Physical Exam   Constitutional: She is oriented to person, place, and time. She appears well-developed and well-nourished. No distress.   HENT:   Head: Normocephalic and atraumatic.   Right Ear: External ear normal. "   Left Ear: External ear normal.   Eyes: Pupils are equal, round, and reactive to light.   Cardiovascular: Normal rate, regular rhythm and normal heart sounds.   Pulmonary/Chest: Breath sounds normal.   Abdominal: Soft. There is no abdominal tenderness.   Musculoskeletal:      Right shoulder: Tenderness present.      Left shoulder: Tenderness present.      Right elbow: Tenderness present.      Left elbow: Tenderness present.      Right wrist: Tenderness present.      Left wrist: Tenderness present.      Cervical back: Neck supple.      Right hip: Tenderness present.      Left hip: Tenderness present.      Right knee: Tenderness present.      Left knee: Tenderness present.      Right ankle: Tenderness present.      Left ankle: Tenderness present.   Lymphadenopathy:     She has no cervical adenopathy.   Neurological: She is alert and oriented to person, place, and time. She displays normal reflexes. No cranial nerve deficit or sensory deficit. She exhibits normal muscle tone. Coordination normal.   Skin: No rash noted. No erythema.   Vitals reviewed.      Right Side Rheumatological Exam     The patient is tender to palpation of the shoulder, elbow, wrist, knee, 1st PIP, 1st MCP, 2nd PIP, 2nd MCP, 3rd PIP, 3rd MCP, 4th PIP, 4th MCP, 5th PIP, hip, ankle, 1st MTP, 2nd MTP, 3rd MTP, 4th MTP, 5th MTP, 1st toe IP, 2nd toe IP, 3rd toe IP, 4th toe IP and 5th toe IP    Left Side Rheumatological Exam     The patient is tender to palpation of the shoulder, elbow, wrist, knee, 1st PIP, 1st MCP, 2nd PIP, 2nd MCP, 3rd PIP, 3rd MCP, 4th PIP, 4th MCP, 5th PIP, 5th MCP, hip, ankle, 1st MTP, 2nd MTP, 3rd MTP, 4th MTP, 5th MTP, 1st toe IP, 2nd toe IP, 3rd toe IP, 4th toe IP and 5th toe IP.         Completed Fibromyalgia exam 18/18 tender points.  No data to display     Assessment:       1. Psoriatic arthritis    2. Fibromyalgia    3. Vitamin D deficiency    4. Hypothyroidism, unspecified type    5. Psoriasis    6. Fibromyalgia syndrome     7. Hypertension, unspecified type    8. Central hypothyroidism    9. Hyperuricemia    10. Dyslipidemia          Medication List with Changes/Refills   New Medications    MAGNESIUM OXIDE (MAG-OX) 400 MG (241.3 MG MAGNESIUM) TABLET    Take 1 tablet (400 mg total) by mouth nightly.       Start Date: 10/10/2023End Date: --    SULFASALAZINE (AZULFIDINE) 500 MG TAB    Take 2 tablets (1,000 mg total) by mouth daily with dinner or evening meal.       Start Date: 10/10/2023End Date: 4/7/2024   Current Medications    AMLODIPINE (NORVASC) 5 MG TABLET    Take 5 mg by mouth once daily.       Start Date: 6/4/2022  End Date: --    CALCIPOTRIENE-BETAMETHASONE (TACLONEX SCALP) EXTERNAL SUSPENSION    SMARTSIG:Sparingly Topical Twice Daily Strength: 0.005-0.064 %       Start Date: 2/14/2023 End Date: --    CLOBETASOL (TEMOVATE) 0.05 % CREAM    Apply 1 application topically 2 (two) times daily. Apply 2 gm topically to affected areas twice daily       Start Date: 6/6/2023  End Date: --    INSULIN GLARGINE (LANTUS) 100 UNIT/ML INJECTION    Inject 20 Units into the skin.       Start Date: --        End Date: --    LIRAGLUTIDE 0.6 MG/0.1 ML, 18 MG/3 ML, SUBQ PNIJ (VICTOZA 2-MURRAY) 0.6 MG/0.1 ML (18 MG/3 ML) PNIJ PEN    Inject 1.8 mg into the skin.       Start Date: --        End Date: --    LISINOPRIL-HYDROCHLOROTHIAZIDE (PRINZIDE,ZESTORETIC) 20-12.5 MG PER TABLET    Take 1 tablet by mouth once daily       Start Date: 8/31/2023 End Date: --    OZEMPIC 0.25 MG OR 0.5 MG (2 MG/3 ML) PEN INJECTOR    Inject 0.5 mg into the skin every 7 days.       Start Date: 5/4/2023  End Date: --    PRAVASTATIN (PRAVACHOL) 10 MG TABLET    Take 10 mg by mouth nightly.       Start Date: 5/8/2022  End Date: --    SECUKINUMAB (COSENTYX PEN) 150 MG/ML PNIJ    Inject 300 mg into the skin every 28 days.       Start Date: 6/7/2023  End Date: --    TRIAMCINOLONE ACETONIDE 0.1% (KENALOG) 0.1 % CREAM    Apply topically 2 (two) times daily. Apple 2 g topically to  affected areas       Start Date: 6/6/2023  End Date: --   Changed and/or Refilled Medications    Modified Medication Previous Medication    HYDROCODONE-ACETAMINOPHEN (NORCO) 5-325 MG PER TABLET HYDROcodone-acetaminophen (NORCO) 5-325 mg per tablet       Take 1 tablet by mouth 3 (three) times daily as needed for Pain.    Take 1 tablet by mouth daily as needed for Pain.       Start Date: 10/10/2023End Date: --    Start Date: 9/7/2023  End Date: 10/10/2023   Discontinued Medications    DAPSONE 100 MG TAB    Take 1 tablet (100 mg total) by mouth after dinner.       Start Date: 6/6/2023  End Date: 10/10/2023    DESVENLAFAXINE SUCCINATE (PRISTIQ) 100 MG TB24    Take 100 mg by mouth once daily.       Start Date: 7/3/2022  End Date: 10/10/2023         Plan:         Problem List Items Addressed This Visit          Derm    Psoriasis    Relevant Medications    sulfaSALAzine (AZULFIDINE) 500 mg Tab    magnesium oxide (MAG-OX) 400 mg (241.3 mg magnesium) tablet    HYDROcodone-acetaminophen (NORCO) 5-325 mg per tablet       Endocrine    Hypothyroid    Relevant Medications    sulfaSALAzine (AZULFIDINE) 500 mg Tab    magnesium oxide (MAG-OX) 400 mg (241.3 mg magnesium) tablet    HYDROcodone-acetaminophen (NORCO) 5-325 mg per tablet    Vitamin D deficiency    Relevant Medications    sulfaSALAzine (AZULFIDINE) 500 mg Tab    magnesium oxide (MAG-OX) 400 mg (241.3 mg magnesium) tablet    HYDROcodone-acetaminophen (NORCO) 5-325 mg per tablet       Orthopedic    Fibromyalgia    Relevant Medications    sulfaSALAzine (AZULFIDINE) 500 mg Tab    magnesium oxide (MAG-OX) 400 mg (241.3 mg magnesium) tablet    HYDROcodone-acetaminophen (NORCO) 5-325 mg per tablet    Psoriatic arthritis - Primary    Relevant Medications    sulfaSALAzine (AZULFIDINE) 500 mg Tab    magnesium oxide (MAG-OX) 400 mg (241.3 mg magnesium) tablet    HYDROcodone-acetaminophen (NORCO) 5-325 mg per tablet     Other Visit Diagnoses       Fibromyalgia syndrome         Relevant Medications    HYDROcodone-acetaminophen (NORCO) 5-325 mg per tablet    Hypertension, unspecified type        Relevant Medications    HYDROcodone-acetaminophen (NORCO) 5-325 mg per tablet    Central hypothyroidism        Relevant Medications    HYDROcodone-acetaminophen (NORCO) 5-325 mg per tablet    Hyperuricemia        Relevant Medications    HYDROcodone-acetaminophen (NORCO) 5-325 mg per tablet    Dyslipidemia        Relevant Medications    HYDROcodone-acetaminophen (NORCO) 5-325 mg per tablet

## 2023-11-26 DIAGNOSIS — I10 HYPERTENSION, UNSPECIFIED TYPE: ICD-10-CM

## 2023-11-27 RX ORDER — LISINOPRIL AND HYDROCHLOROTHIAZIDE 12.5; 2 MG/1; MG/1
1 TABLET ORAL DAILY
Qty: 90 TABLET | Refills: 0 | OUTPATIENT
Start: 2023-11-27

## 2023-11-27 NOTE — TELEPHONE ENCOUNTER
Patient will notify her PCP for a refill on this medication and she will establish care at 's new practice. She will call back if unable to get filled.